# Patient Record
Sex: FEMALE | Race: WHITE | Employment: UNEMPLOYED | URBAN - METROPOLITAN AREA
[De-identification: names, ages, dates, MRNs, and addresses within clinical notes are randomized per-mention and may not be internally consistent; named-entity substitution may affect disease eponyms.]

---
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
## 2019-03-13 ENCOUNTER — OFFICE VISIT (OUTPATIENT)
Dept: OBGYN CLINIC | Age: 19
End: 2019-03-13
Payer: COMMERCIAL

## 2019-03-13 ENCOUNTER — HOSPITAL ENCOUNTER (OUTPATIENT)
Age: 19
Setting detail: SPECIMEN
Discharge: HOME | End: 2019-03-13
Payer: COMMERCIAL

## 2019-03-13 VITALS
RESPIRATION RATE: 18 BRPM | BODY MASS INDEX: 32.4 KG/M2 | DIASTOLIC BLOOD PRESSURE: 80 MMHG | HEART RATE: 94 BPM | TEMPERATURE: 97.9 F | WEIGHT: 171.6 LBS | SYSTOLIC BLOOD PRESSURE: 130 MMHG | HEIGHT: 61 IN

## 2019-03-13 DIAGNOSIS — N92.6 MISSED MENSES: Primary | ICD-10-CM

## 2019-03-13 DIAGNOSIS — N91.2 AMENORRHEA: ICD-10-CM

## 2019-03-13 DIAGNOSIS — Z32.01 POSITIVE PREGNANCY TEST: ICD-10-CM

## 2019-03-13 DIAGNOSIS — O36.80X0 PREGNANCY WITH INCONCLUSIVE FETAL VIABILITY, SINGLE OR UNSPECIFIED FETUS: ICD-10-CM

## 2019-03-13 LAB
CONTROL: ABNORMAL
HCG QUANTITATIVE: 2557 IU/L
PREGNANCY TEST URINE, POC: POSITIVE

## 2019-03-13 PROCEDURE — 81025 URINE PREGNANCY TEST: CPT | Performed by: CLINICAL NURSE SPECIALIST

## 2019-03-13 PROCEDURE — 99213 OFFICE O/P EST LOW 20 MIN: CPT | Performed by: CLINICAL NURSE SPECIALIST

## 2019-03-13 RX ORDER — PROMETHAZINE HYDROCHLORIDE 25 MG/1
25 TABLET ORAL EVERY 8 HOURS PRN
Qty: 30 TABLET | Refills: 2 | Status: SHIPPED | OUTPATIENT
Start: 2019-03-13 | End: 2019-04-29

## 2019-03-13 RX ORDER — VITAMIN A ACETATE, .BETA.-CAROTENE, ASCORBIC ACID, CHOLECALCIFEROL, .ALPHA.-TOCOPHEROL ACETATE, DL-, THIAMINE MONONITRATE, RIBOFLAVIN, NIACINAMIDE, PYRIDOXINE HYDROCHLORIDE, FOLIC ACID, CYANOCOBALAMIN, CALCIUM CARBONATE, FERROUS FUMARATE, ZINC OXIDE, AND CUPRIC OXIDE 2000; 2000; 120; 400; 22; 1.84; 3; 20; 10; 1; 12; 200; 27; 25; 2 [IU]/1; [IU]/1; MG/1; [IU]/1; MG/1; MG/1; MG/1; MG/1; MG/1; MG/1; UG/1; MG/1; MG/1; MG/1; MG/1
1 TABLET ORAL DAILY
Qty: 30 TABLET | Refills: 11 | Status: SHIPPED | OUTPATIENT
Start: 2019-03-13 | End: 2020-05-05

## 2019-03-18 ENCOUNTER — NURSE ONLY (OUTPATIENT)
Dept: OBGYN CLINIC | Age: 19
End: 2019-03-18

## 2019-03-18 ENCOUNTER — HOSPITAL ENCOUNTER (OUTPATIENT)
Age: 19
Setting detail: SPECIMEN
Discharge: HOME | End: 2019-03-18
Payer: COMMERCIAL

## 2019-03-18 DIAGNOSIS — Z32.01 POSITIVE PREGNANCY TEST: Primary | ICD-10-CM

## 2019-03-18 DIAGNOSIS — Z32.01 POSITIVE PREGNANCY TEST: ICD-10-CM

## 2019-03-18 DIAGNOSIS — N91.2 AMENORRHEA: ICD-10-CM

## 2019-03-18 LAB — HCG QUANTITATIVE: ABNORMAL IU/L

## 2019-03-20 ENCOUNTER — HOSPITAL ENCOUNTER (EMERGENCY)
Age: 19
Discharge: HOME | End: 2019-03-21
Attending: EMERGENCY MEDICINE
Payer: COMMERCIAL

## 2019-03-20 VITALS
DIASTOLIC BLOOD PRESSURE: 90 MMHG | TEMPERATURE: 99 F | WEIGHT: 171 LBS | BODY MASS INDEX: 32.28 KG/M2 | SYSTOLIC BLOOD PRESSURE: 160 MMHG | RESPIRATION RATE: 16 BRPM | OXYGEN SATURATION: 100 % | HEART RATE: 117 BPM | HEIGHT: 61 IN

## 2019-03-20 DIAGNOSIS — L50.9 URTICARIA: Primary | ICD-10-CM

## 2019-03-20 PROCEDURE — 99282 EMERGENCY DEPT VISIT SF MDM: CPT

## 2019-03-20 PROCEDURE — 6370000000 HC RX 637 (ALT 250 FOR IP): Performed by: PHYSICIAN ASSISTANT

## 2019-03-20 RX ORDER — DIPHENHYDRAMINE HCL 25 MG
25 TABLET ORAL ONCE
Status: DISCONTINUED | OUTPATIENT
Start: 2019-03-20 | End: 2019-03-20

## 2019-03-20 RX ORDER — DIPHENHYDRAMINE HCL 25 MG
50 TABLET ORAL ONCE
Status: COMPLETED | OUTPATIENT
Start: 2019-03-20 | End: 2019-03-20

## 2019-03-20 RX ADMIN — DIPHENHYDRAMINE HCL 50 MG: 25 TABLET ORAL at 23:37

## 2019-03-21 ASSESSMENT — ENCOUNTER SYMPTOMS
VOMITING: 0
NAUSEA: 0
ABDOMINAL PAIN: 0

## 2019-04-29 ENCOUNTER — INITIAL PRENATAL (OUTPATIENT)
Dept: OBGYN CLINIC | Age: 19
End: 2019-04-29

## 2019-04-29 ENCOUNTER — HOSPITAL ENCOUNTER (OUTPATIENT)
Age: 19
Setting detail: SPECIMEN
Discharge: HOME | End: 2019-04-29
Payer: COMMERCIAL

## 2019-04-29 VITALS
BODY MASS INDEX: 31.89 KG/M2 | DIASTOLIC BLOOD PRESSURE: 68 MMHG | HEART RATE: 76 BPM | SYSTOLIC BLOOD PRESSURE: 119 MMHG | WEIGHT: 168.8 LBS

## 2019-04-29 DIAGNOSIS — Z3A.11 11 WEEKS GESTATION OF PREGNANCY: ICD-10-CM

## 2019-04-29 DIAGNOSIS — Z11.3 SCREEN FOR STD (SEXUALLY TRANSMITTED DISEASE): ICD-10-CM

## 2019-04-29 DIAGNOSIS — N92.6 MISSED MENSES: ICD-10-CM

## 2019-04-29 DIAGNOSIS — Z34.01 ENCOUNTER FOR SUPERVISION OF NORMAL FIRST PREGNANCY IN FIRST TRIMESTER: Primary | ICD-10-CM

## 2019-04-29 DIAGNOSIS — N91.2 AMENORRHEA: ICD-10-CM

## 2019-04-29 LAB
-: NORMAL
ABO/RH: NORMAL
ABSOLUTE EOS #: 0.16 K/UL (ref 0–0.44)
ABSOLUTE IMMATURE GRANULOCYTE: <0.03 K/UL (ref 0–0.3)
ABSOLUTE LYMPH #: 1.83 K/UL (ref 1.2–5.2)
ABSOLUTE MONO #: 0.6 K/UL (ref 0.1–1.4)
AMORPHOUS: NORMAL
ANTIBODY SCREEN: NEGATIVE
BACTERIA: NORMAL
BASOPHILS # BLD: 0 % (ref 0–2)
BASOPHILS ABSOLUTE: 0.03 K/UL (ref 0–0.2)
BILIRUBIN URINE: NEGATIVE
CASTS UA: NORMAL /LPF (ref 0–8)
COLOR: YELLOW
COMMENT UA: ABNORMAL
CRYSTALS, UA: NORMAL /HPF
DIFFERENTIAL TYPE: ABNORMAL
EOSINOPHILS RELATIVE PERCENT: 2 % (ref 1–4)
EPITHELIAL CELLS UA: NORMAL /HPF (ref 0–5)
GLUCOSE URINE: NEGATIVE
HCT VFR BLD CALC: 38.6 % (ref 36.3–47.1)
HEMOGLOBIN: 13 G/DL (ref 11.9–15.1)
HEPATITIS B SURFACE ANTIGEN: NONREACTIVE
HIV AG/AB: NONREACTIVE
IMMATURE GRANULOCYTES: 0 %
KETONES, URINE: NEGATIVE
LEUKOCYTE ESTERASE, URINE: ABNORMAL
LYMPHOCYTES # BLD: 22 % (ref 25–45)
MCH RBC QN AUTO: 29.6 PG (ref 25.2–33.5)
MCHC RBC AUTO-ENTMCNC: 33.7 G/DL (ref 28.4–34.8)
MCV RBC AUTO: 87.9 FL (ref 82.6–102.9)
MONOCYTES # BLD: 7 % (ref 2–8)
MUCUS: NORMAL
NITRITE, URINE: NEGATIVE
NRBC AUTOMATED: 0 PER 100 WBC
OTHER OBSERVATIONS UA: NORMAL
PDW BLD-RTO: 12.7 % (ref 11.8–14.4)
PH UA: 8 (ref 5–8)
PLATELET # BLD: 273 K/UL (ref 138–453)
PLATELET ESTIMATE: ABNORMAL
PMV BLD AUTO: 10.3 FL (ref 8.1–13.5)
PROTEIN UA: NEGATIVE
RBC # BLD: 4.39 M/UL (ref 3.95–5.11)
RBC # BLD: ABNORMAL 10*6/UL
RBC UA: NORMAL /HPF (ref 0–4)
RENAL EPITHELIAL, UA: NORMAL /HPF
RUBV IGG SER QL: 24.8 IU/ML
SEG NEUTROPHILS: 69 % (ref 34–64)
SEGMENTED NEUTROPHILS ABSOLUTE COUNT: 5.67 K/UL (ref 1.8–8)
SICKLE CELL SCREEN: NEGATIVE
SPECIFIC GRAVITY UA: 1.01 (ref 1–1.03)
T. PALLIDUM, IGG: NONREACTIVE
TRICHOMONAS: NORMAL
TSH SERPL DL<=0.05 MIU/L-ACNC: 1.37 MIU/L (ref 0.3–5)
TURBIDITY: CLEAR
URINE HGB: ABNORMAL
UROBILINOGEN, URINE: NORMAL
WBC # BLD: 8.3 K/UL (ref 4.5–13.5)
WBC # BLD: ABNORMAL 10*3/UL
WBC UA: NORMAL /HPF (ref 0–5)
YEAST: NORMAL

## 2019-04-29 PROCEDURE — 0502F SUBSEQUENT PRENATAL CARE: CPT | Performed by: CLINICAL NURSE SPECIALIST

## 2019-04-29 NOTE — PROGRESS NOTES
Patient is in the office today for IPV visit/lab  Draw per provider order using sterile technique. Attempts x3 in the Lt antecubital, et hand  made, with final draw Drawn from the Rt antecubital.  Patient tolerated procedure well.

## 2019-04-29 NOTE — PROGRESS NOTES
Verenice Lamas is a 22 y.o. female 9w2d        OB History    Para Term  AB Living   1             SAB TAB Ectopic Molar Multiple Live Births                    # Outcome Date GA Lbr Wayne/2nd Weight Sex Delivery Anes PTL Lv   1 Current                  Blood pressure 119/68, pulse 76, weight 168 lb 12.8 oz (76.6 kg), last menstrual period 2019, not currently breastfeeding. The patient was seen and evaluated. There was N/A fetal movements. No contractions or leakage of fluid. Signs and symptoms of  labor as well as labor were reviewed. Dates were reviewed with the patient. The physical exam will be  completed along with pap and cultures at her next visit. Prenatal labs were drawn today. The patient will return to the office for her next visit in 2 weeks. See antepartum flow sheet. Patient Active Problem List    Diagnosis Date Noted    Bipolar 1 disorder ([facility] [address] 10/08/2018    PCOS (polycystic ovarian syndrome) 2017    Deliberate self-cutting 2017    Need for Menactra vaccination 2016        Diagnosis Orders   1. Encounter for supervision of normal first pregnancy in first trimester  Glucose tolerance, 1 hour   2. 11 weeks gestation of pregnancy     3. Screen for STD (sexually transmitted disease)  Chlamydia Trachomatis & Neisseria gonorrhoeae (GC) by amplified detection    Continue prenatal vitamins, increase water intake and frequent rest periods as needed. 11w4d Prenatal history and OB education, including milestones throughout the pregnancy, vaccinations recommended while pregnant, any risk factors that may cause the patient to become high risk requiring  testing, and any viruses that may cause complications or fetal anomalies was completed. Total time spent with patient was 20 minutes face-to-face.       Electronically signed by: Scott Bruno CNP

## 2019-04-30 LAB
CULTURE: NORMAL
Lab: NORMAL
SPECIMEN DESCRIPTION: NORMAL

## 2019-05-06 LAB — CYSTIC FIBROSIS: NORMAL

## 2019-05-08 DIAGNOSIS — N92.6 MISSED MENSES: ICD-10-CM

## 2019-05-08 DIAGNOSIS — N91.2 AMENORRHEA: ICD-10-CM

## 2019-05-14 ENCOUNTER — HOSPITAL ENCOUNTER (OUTPATIENT)
Age: 19
Setting detail: SPECIMEN
Discharge: HOME | End: 2019-05-14
Payer: COMMERCIAL

## 2019-05-14 ENCOUNTER — ROUTINE PRENATAL (OUTPATIENT)
Dept: OBGYN CLINIC | Age: 19
End: 2019-05-14

## 2019-05-14 VITALS
WEIGHT: 165 LBS | DIASTOLIC BLOOD PRESSURE: 78 MMHG | BODY MASS INDEX: 31.18 KG/M2 | HEART RATE: 94 BPM | SYSTOLIC BLOOD PRESSURE: 118 MMHG

## 2019-05-14 DIAGNOSIS — Z34.01 ENCOUNTER FOR SUPERVISION OF NORMAL FIRST PREGNANCY IN FIRST TRIMESTER: Primary | ICD-10-CM

## 2019-05-14 DIAGNOSIS — Z34.01 ENCOUNTER FOR SUPERVISION OF NORMAL FIRST PREGNANCY IN FIRST TRIMESTER: ICD-10-CM

## 2019-05-14 DIAGNOSIS — N76.0 ACUTE VAGINITIS: ICD-10-CM

## 2019-05-14 DIAGNOSIS — Z11.3 SCREEN FOR STD (SEXUALLY TRANSMITTED DISEASE): ICD-10-CM

## 2019-05-14 DIAGNOSIS — Z3A.13 13 WEEKS GESTATION OF PREGNANCY: ICD-10-CM

## 2019-05-14 LAB
GLUCOSE ADMINISTRATION: NORMAL
GLUCOSE TOLERANCE SCREEN 50G: 108 MG/DL (ref 70–135)

## 2019-05-14 PROCEDURE — 0502F SUBSEQUENT PRENATAL CARE: CPT | Performed by: CLINICAL NURSE SPECIALIST

## 2019-05-14 RX ORDER — METRONIDAZOLE 500 MG/1
500 TABLET ORAL 2 TIMES DAILY
Qty: 14 TABLET | Refills: 0 | Status: SHIPPED | OUTPATIENT
Start: 2019-05-14 | End: 2019-05-21

## 2019-05-14 NOTE — PROGRESS NOTES
Nora Barillas is a 22 y.o. female 13w5d        OB History    Para Term  AB Living   1             SAB TAB Ectopic Molar Multiple Live Births                    # Outcome Date GA Lbr Wayne/2nd Weight Sex Delivery Anes PTL Lv   1 Current                Blood pressure 118/78, pulse 94, weight 165 lb (74.8 kg), last menstrual period 2019, not currently breastfeeding. The patient was seen and evaluated. There was N/A fetal movements. No contractions or leakage of fluid. Signs and symptoms of  labor as well as labor were reviewed. A Quad Screen was ordered for a 15-20 week gestational age window. Dates were reviewed with the patient. An 18-22 week anatomy ultrasound has been ordered. The patient will return to the office for her next visit in 4 weeks. See antepartum flow sheet. Patient Active Problem List    Diagnosis Date Noted    Bipolar 1 disorder ([facility] [address] 10/08/2018    PCOS (polycystic ovarian syndrome) 2017    Deliberate self-cutting 2017    Need for Menactra vaccination 2016        Diagnosis Orders   1. Encounter for supervision of normal first pregnancy in first trimester     2. 13 weeks gestation of pregnancy     Continue prenatal vitamins, increase water intake and frequent rest periods as needed.       Electronically signed by: Germania Latif CNP

## 2019-05-14 NOTE — PATIENT INSTRUCTIONS
Patient Education        Learning About When to Call Your Doctor During Pregnancy (Up to 20 Weeks)  Your Care Instructions    It's common to have concerns about what might be a problem during pregnancy. Although most pregnant women don't have any serious problems, it's important to know when to call your doctor if you have certain symptoms. These are general suggestions. Your doctor may give you some more information about when to call. When to call your doctor (up to 20 weeks)  Call 911 anytime you think you may need emergency care. For example, call if:  · You passed out (lost consciousness). Call your doctor now or seek immediate medical care if:  · You have a fever. · You have vaginal bleeding. · You are dizzy or lightheaded, or you feel like you may faint. · You have symptoms of a urinary tract infection. These may include:  ? Pain or burning when you urinate. ? A frequent need to urinate without being able to pass much urine. ? Pain in the flank, which is just below the rib cage and above the waist on either side of the back. ? Blood in your urine. · You have belly pain. · You think you are having contractions. · You have a sudden release of fluid from your vagina. Watch closely for changes in your health, and be sure to contact your doctor if:  · You have vaginal discharge that smells bad. · You have other concerns about your pregnancy. Follow-up care is a key part of your treatment and safety. Be sure to make and go to all appointments, and call your doctor if you are having problems. It's also a good idea to know your test results and keep a list of the medicines you take. Where can you learn more? Go to https://fadia.Funium. org and sign in to your "[employer] account. Enter I011 in the [facility] box to learn more about \"Learning About When to Call Your Doctor During Pregnancy (Up to 20 Weeks). \"     If you do not have an account, please click on the \"Sign Up Now\" link. Current as of: September 5, 2018  Content Version: 12.0  © 0225-7371 [employer]USYS. Care instructions adapted under license by [facility] ([facility]). If you have questions about a medical condition or this instruction, always ask your healthcare professional. [address] any warranty or liability for your use of this information. Patient Education        Round Ligament Pain: Care Instructions  Your Care Instructions    Round ligament pain is a common pain during pregnancy. You may feel a sharp brief pain on one or both sides of your belly. It may go down into your groin. It's usually felt for the first time during the second trimester. This pain is a normal part of pregnancy. It will go away as your pregnancy continues or after your baby is born. Your uterus is supported by two ligaments that go from the top and sides of the uterus to the bones of the pelvis. These are the round ligaments. As your uterus grows, these ligaments stretch and tighten with your movements. This may be the cause of the pain. You may find that certain activities seem to cause pain. If you can, avoid those activities. Your doctor can usually diagnose round ligament pain from your symptoms and an exam. If you have bleeding or other symptoms, your doctor may also do an imaging test, such as an ultrasound. Your doctor may suggest that you take an over-the-counter pain medicine, such as acetaminophen. Follow-up care is a key part of your treatment and safety. Be sure to make and go to all appointments, and call your doctor if you are having problems. It's also a good idea to know your test results and keep a list of the medicines you take. How can you care for yourself at home? · If certain movements seem to trigger the pain, see if you can avoid them while you are pregnant. · Stay active. If your doctor says it's okay, try moderate exercise.  Many pregnant women find that water exercise is most comfortable. Examples are swimming and water aerobics. · Ask your doctor about taking acetaminophen for pain. Be safe with medicines. Read and follow all instructions on the label. When should you call for help? Call your doctor now or seek immediate medical care if:    · You think you might be in labor.     · You have new or worse pain.    Watch closely for changes in your health, and be sure to contact your doctor if you have any problems. Where can you learn more? Go to https://FloobitspeArtify It.RegeneRx. org and sign in to your [employer] account. Enter R110 in the [employer] box to learn more about \"Round Ligament Pain: Care Instructions. \"     If you do not have an account, please click on the \"Sign Up Now\" link. Current as of: September 5, 2018  Content Version: 12.0  © 7669-2114 Healthwise, Incorporated. Care instructions adapted under license by [facility] ([facility]). If you have questions about a medical condition or this instruction, always ask your healthcare professional. [place] [address] any warranty or liability for your use of this information. Patient Education        Screening Tests for Birth Defects: Care Instructions  Your Care Instructions    Screening tests for birth defects are done during pregnancy to look for possible problems with the baby (fetus). They show the chance that a baby has a certain birth defect. Down syndrome, spina bifida, and trisomy 25 are examples. There are many types of screening tests you may have during your pregnancy. During your first trimester you may have:  · Blood tests at 10 to 13 weeks. · Nuchal translucency test at 11 to 14 weeks. · Cell free fetal DNA test at 10 weeks or later. During your second trimester, you may have:  · Triple or quad screening at 15 to 20 weeks. · Ultrasound at 18 to 20 weeks. Follow-up care is a key part of your treatment and safety.  Be sure to make and go to all appointments, and call your doctor if the \"Sign Up Now\" link. Current as of: September 5, 2018  Content Version: 12.0  © 3936-8738 Tyk[employer]. Care instructions adapted under license by [facility] ([facility]). If you have questions about a medical condition or this instruction, always ask your healthcare professional. [address] any warranty or liability for your use of this information. Patient Education        Learning About Prenatal Visits  Your Care Instructions    Regular prenatal visits are very important during any pregnancy. These quick office visits may seem simple and routine. But they can help you and your baby stay healthy. Your doctor is watching for problems that can only be found by regularly checking you and your baby. The visits also give you and your doctor time to build a good relationship. Many women have prenatal visits every 4 to 6 weeks until week 28 of pregnancy. Then the visits become more frequent. This is often every 2 to 3 weeks through week 36 of pregnancy. In the final month of pregnancy, you likely will see your doctor every week. You may have a different schedule if you have a medical problem or are a teen. At different times in your pregnancy, you will have exams and tests. Some are routine. Others are done only when there is a chance of a problem. Everything healthy you do for your body helps your growing baby. Rest when you need it. Eat well, drink plenty of water, and exercise regularly. What happens during a prenatal visit? · You will have blood pressure checks, along with urine tests. You also may have blood tests. If you need to go to the bathroom while waiting for the doctor, tell the nurse. He or she will give you a sample cup so your urine can be tested. · You will be weighed and have your belly measured. · Your doctor may listen to your baby's heartbeat with a special stethoscope.   · In your second trimester, your doctor will check your blood sugar (glucose tolerance to your [employer] account. Enter J502 in the [facility] box to learn more about \"Learning About Prenatal Visits. \"     If you do not have an account, please click on the \"Sign Up Now\" link. Current as of: September 5, 2018  Content Version: 12.0  © 8508-1752 [employer]. Care instructions adapted under license by [facility] ([facility]). If you have questions about a medical condition or this instruction, always ask your healthcare professional. [address] any warranty or liability for your use of this information. Patient Education        Nutrition During Pregnancy: Care Instructions  Your Care Instructions    Healthy eating when you are pregnant is important for you and your baby. It can help you feel well and have a successful pregnancy and delivery. During pregnancy your nutrition needs increase. Even if you have excellent eating habits, your doctor may recommend a multivitamin to make sure you get enough iron and folic acid. Many pregnant women wonder how much weight they should gain. In general, women who were at a healthy weight before they became pregnant should gain between 25 and 35 pounds. Women who were overweight before pregnancy are usually advised to gain 15 to 25 pounds. Women who were underweight before pregnancy are usually advised to gain 28 to 40 pounds. Your doctor will work with you to set a weight goal that is right for you. Gaining a healthy amount of weight helps you have a healthy baby. Follow-up care is a key part of your treatment and safety. Be sure to make and go to all appointments, and call your doctor if you are having problems. It's also a good idea to know your test results and keep a list of the medicines you take. How can you care for yourself at home? · Eat plenty of fruits and vegetables. Include a variety of orange, yellow, and leafy dark-green vegetables every day. · Choose whole-grain bread, cereal, and pasta.  Good choices include whole wheat bread, whole wheat pasta, brown rice, and oatmeal.  · Get 4 or more servings of milk and milk products each day. Good choices include nonfat or low-fat milk, yogurt, and cheese. If you cannot eat milk products, you can get calcium from calcium-fortified products such as orange juice, soy milk, and tofu. Other non-milk sources of calcium include leafy green vegetables, such as broccoli, kale, mustard greens, turnip greens, bok jairon, and brussels sprouts. · If you eat meat, pick lower-fat types. Good choices include lean cuts of meat and chicken or turkey without the skin. · Do not eat shark, swordfish, janel mackerel, or tilefish. They have high levels of mercury, which is dangerous to your baby. You can eat up to 12 ounces a week of fish or shellfish that have low mercury levels. Good choices include shrimp, wild salmon, pollock, and catfish. Do not eat more than 6 ounces of tuna each week. · Heat lunch meats (such as turkey, ham, or bologna) to 165°F before you eat them. This reduces your risk of getting sick from a kind of bacteria that can be found in lunch meats. · Do not eat unpasteurized soft cheeses, such as brie, feta, fresh mozzarella, and blue cheese. They have a bacteria that could harm your baby. · Limit caffeine. If you drink coffee or tea, have no more than 1 cup a day. Caffeine is also found in elma. · Do not drink any alcohol. No amount of alcohol has been found to be safe during pregnancy. · Do not diet or try to lose weight. For example, do not follow a low-carbohydrate diet. If you are overweight at the start of your pregnancy, your doctor will work with you to manage your weight gain. · Tell your doctor about all vitamins and supplements you take. When should you call for help? Watch closely for changes in your health, and be sure to contact your doctor if you have any problems. Where can you learn more? Go to https://fadia.health-partners. org and sign in to your [employer] account. Enter Y785 in the [facility] box to learn more about \"Nutrition During Pregnancy: Care Instructions. \"     If you do not have an account, please click on the \"Sign Up Now\" link. Current as of: September 5, 2018  Content Version: 12.0  © 2453-2438 [employer]. Care instructions adapted under license by [facility] ([facility]). If you have questions about a medical condition or this instruction, always ask your healthcare professional. [address] any warranty or liability for your use of this information. Patient Education        Learning About Pregnancy  Your Care Instructions    Your health in the early weeks of your pregnancy is particularly important for your baby's health. Take good care of yourself. Anything you do that harms your body can also harm your baby. Make sure to go to all of your doctor appointments. Regular checkups will help keep you and your baby healthy. How can you care for yourself at home? Diet    · Eat a balanced diet. Make sure your diet includes plenty of beans, peas, and leafy green vegetables.     · Do not skip meals or go for many hours without eating. If you are nauseated, try to eat a small, healthy snack every 2 to 3 hours.     · Do not eat fish that has a high level of mercury, such as shark, swordfish, or mackerel. Do not eat more than one can of tuna each week.     · Drink plenty of fluids, enough so that your urine is light yellow or clear like water. If you have kidney, heart, or liver disease and have to limit fluids, talk with your doctor before you increase the amount of fluids you drink.     · Cut down on caffeine, such as coffee, tea, and cola.     · Do not drink alcohol, such as beer, wine, or hard liquor.     · Take a multivitamin that contains at least 400 micrograms (mcg) of folic acid to help prevent birth defects. Fortified cereal and whole wheat bread are good additional sources of folic acid.   · Increase the calcium in your diet. Try to drink a quart of skim milk each day. You may also take calcium supplements and choose foods such as cheese and yogurt.    Lifestyle    · Make sure you go to your follow-up appointments.     · Get plenty of rest. You may be unusually tired while you are pregnant.     · Get at least 30 minutes of exercise on most days of the week. Walking is a good choice. If you have not exercised in the past, start out slowly. Take several short walks each day.     · Do not smoke. If you need help quitting, talk to your doctor about stop-smoking programs. These can increase your chances of quitting for good.     · Do not touch cat feces or litter boxes. Also, wash your hands after you handle raw meat, and fully cook all meat before you eat it. Wear gloves when you work in the yard or garden, and wash your hands well when you are done. Cat feces, raw or undercooked meat, and contaminated dirt can cause an infection that may harm your baby or lead to a miscarriage.     · Do not use saunas or hot tubs. Raising your body temperature may harm your baby.     · Avoid chemical fumes, paint fumes, or poisons.     · Do not use illegal drugs or alcohol. Medicines    · Review all of your medicines with your doctor. Some of your routine medicines may need to be changed to protect your baby.     · Use acetaminophen (Tylenol) to relieve minor problems, such as a mild headache or backache or a mild fever with cold symptoms. Do not use nonsteroidal anti-inflammatory drugs (NSAIDs), such as ibuprofen (Advil, Motrin) or naproxen (Aleve), unless your doctor says it is okay.     · Do not take two or more pain medicines at the same time unless the doctor told you to. Many pain medicines have acetaminophen, which is Tylenol. Too much acetaminophen (Tylenol) can be harmful.     · Take your medicines exactly as prescribed.  Call your doctor if you think you are having a problem with your medicine.    To manage morning sickness    · If you feel sick when you first wake up, try eating a small snack (such as crackers) before you get out of bed. Allow some time to digest the snack, and then get out of bed slowly.     · Do not skip meals or go for long periods without eating. An empty stomach can make nausea worse.     · Eat small, frequent meals instead of three large meals each day.     · Drink plenty of fluids. Sports drinks, such as Gatorade or Powerade, are good choices.     · Eat foods that are high in protein but low in fat.     · If you are taking iron supplements, ask your doctor if they are necessary. Iron can make nausea worse.     · Avoid any smells, such as coffee, that make you feel sick.     · Get lots of rest. Morning sickness may be worse when you are tired. Follow-up care is a key part of your treatment and safety. Be sure to make and go to all appointments, and call your doctor if you are having problems. It's also a good idea to know your test results and keep a list of the medicines you take. Where can you learn more? Go to https://Loot!.[employer] Technologies. org and sign in to your [employer] account. Enter T745 in the [employer] box to learn more about \"Learning About Pregnancy. \"     If you do not have an account, please click on the \"Sign Up Now\" link. Current as of: September 5, 2018  Content Version: 12.0  © 7893-7923 Healthwise, [employer]. Care instructions adapted under license by [facility] ([facility]). If you have questions about a medical condition or this instruction, always ask your healthcare professional. [place] [address] any warranty or liability for your use of this information. Patient Education        Exercise During Pregnancy: Care Instructions  Your Care Instructions    Exercise is good for healthy pregnant women. It can relieve back pain, swelling, and other discomforts of pregnancy. It also prepares your muscles for childbirth.  And exercise can improve your energy level and help you sleep better. If your doctor recommends it, get more exercise. Walking is a good choice. Bit by bit, increase the amount you walk every day. Try for at least 30 minutes on most days of the week. But if you do not already exercise, be sure to talk with your doctor before you start a new exercise program. Try exercise classes for pregnant women. Doctors do not recommend contact sports during pregnancy. Follow-up care is a key part of your treatment and safety. Be sure to make and go to all appointments, and call your doctor if you are having problems. It's also a good idea to know your test results and keep a list of the medicines you take. How can you care for yourself at home? · Talk with your doctor about the right kind of exercise for each stage of pregnancy. · Listen to your body to know if your exercise is at a safe level. ? Do not become overheated while you exercise. ? If you feel tired, take it easy. You might walk instead of run.  ? If you are used to strenuous exercise, pay attention to changes in your body that mean it is time to slow down. ? High body temperature can be harmful to your baby. So if you want to use a sauna or hot tub, be sure to talk to your doctor about how to use them safely. · If you exercised before getting pregnant, you should be able to keep up your routine early in your pregnancy. That might include running and aerobics. Later, you may want to switch to swimming or walking. · Eat a small snack or drink juice 15 to 30 minutes before you exercise. · Eat a healthy diet. Make sure it includes plenty of beans, peas, and leafy green vegetables. You may need to increase how much you eat to get extra energy for exercise. · Drink plenty of fluids before, during, and after exercise. · Avoid contact sports, such as soccer and basketball. Also avoid scuba diving, exercise in high altitude (above 6,000 feet), and horseback riding.   · Do not get overtired while you exercise. You should be able to talk while you work out. · After your fourth month of pregnancy, avoid exercises (such as sit-ups and some yoga poses) that require you to lie flat on your back on a hard surface. · Try swimming and brisk walking during all your pregnancy. · Get plenty of rest. You may be very tired while you are pregnant. Where can you learn more? Go to https://chpepiceweb.[employer]. org and sign in to your [employer] account. Enter T322 in the [address] box to learn more about \"Exercise During Pregnancy: Care Instructions. \"     If you do not have an account, please click on the \"Sign Up Now\" link. Current as of: September 5, 2018  Content Version: 12.0  © 9990-4181 Healthwise, [employer]. Care instructions adapted under license by [facility] ([facility]). If you have questions about a medical condition or this instruction, always ask your healthcare professional. [place] [address] any warranty or liability for your use of this information. Patient Education        During Pregnancy: Exercises  Your Care Instructions  Here are some examples of exercises to do during your pregnancy. Start each exercise slowly. Ease off the exercise if you start to have pain. Your doctor or physical therapist will tell you when you can start these exercises and which ones will work best for you. How to do the exercises  Neck rotation    1. Sit in a firm chair, or stand up straight. 2. Keeping your chin level, turn your head to the right, and hold for 15 to 30 seconds. 3. Turn your head to the left and hold for 15 to 30 seconds. 4. Repeat 2 to 4 times to each side. Forward neck flexion    1. Sit in a firm chair, or stand up straight. 2. Bend your head forward. 3. Hold for 15 to 30 seconds. 4. Repeat 2 to 4 times. Back press    1. Place your feet 10 to 12 inches from the wall.   2. Rest your back flat against the wall and slide down the wall until your knees are slightly bent. 3. Press your lower back against the wall by pulling in your stomach muscles. 4. Hold for 6 seconds, and then relax your stomach muscles and slide back up the wall. 5. Repeat 8 to 12 times. Full body twist    1. Sit with your legs crossed. 2. Reach your left hand toward your right foot, and place your right hand at your side for support. 3. Slowly twist your torso to your right. 4. Switch your hands and twist to your left. 5. Repeat 2 to 4 times. Pelvic rocking    1. Kneeling on hands and knees, place your hands directly under your shoulders and your knees under your hips. 2. Breathe in deeply. Tuck your head downward and round your back up, making a curve with your back in the shape of the letter C. Hold this position for a count of 6.  3. Breathe out slowly and bring your head back up. Relax, keeping your back straight (don't allow it to curve toward the floor). Hold this for a count of 6.  4. Do this exercise 8 times or to your comfort level. Pelvic tilt    1. Lie on your back. 2. Keep your knees relaxed. 3. Tighten your belly and buttocks muscles. 4. At the same time, gently shift your pelvis upward. This should flatten the curve in your back. 5. Hold for 6 seconds and then relax. 6. Gradually increase the number of tilts you do each day, to your comfort level. Backward stretch    1. Kneel on hands and knees with your knees 8 to 10 inches apart, hands directly under your shoulders, and arms and back straight. 2. Keeping your arms straight, slowly lower your buttocks toward your heels and tuck your head toward your knees. Hold for 15 to 30 seconds. 3. Slowly return to the kneeling position. 4. Repeat 2 to 4 times. Forward bend    1. Sit comfortably in a chair, with your arms relaxed. 2. Slowly bend forward, allowing your arms to hang down in front of you. Lean only as far as you can without feeling discomfort or pressure on your belly.   3. Hold for 15 to 30 seconds and then slowly sit up straight. 4. Repeat 2 to 4 times or to your comfort level. Leg lift crawl    1. Kneeling on hands and knees, place your hands directly under your shoulders and straighten your arms. 2. Tighten your belly muscles by pulling in your belly button toward your spine. Be sure you continue to breathe normally and do not hold your breath. 3. Lift your left knee and bring it toward your elbow. 4. Slowly extend your leg behind you without completely straightening it. Be careful not to let your hip drop down. Avoid arching your back. 5. Hold your leg behind you for about 6 seconds. 6. Return to your starting position. 7. Do the same exercise with your other leg. 8. Repeat 8 to 12 times for each leg. Tailor sitting    1. Sit on the floor. 2. Bring your feet close to your body while crossing your ankles. 3. Hold this position for as long as you are comfortable. Tailor stretching    1. Sit on the floor with your back straight, legs about 12 inches apart, and feet relaxed outward. 2. Stretch your hands forward toward your left foot, then sit up. 3. Stretch your hands straight forward, then sit up. 4. Stretch your hands forward toward your right foot, then sit up. 5. Hold each stretch for 15 to 30 seconds. 6. Repeat 2 to 4 times. Follow-up care is a key part of your treatment and safety. Be sure to make and go to all appointments, and call your doctor if you are having problems. It's also a good idea to know your test results and keep a list of the medicines you take. Where can you learn more? Go to https://Boatboundjames.[employer]. org and sign in to your [employer] account. Enter O791 in the [facility] box to learn more about \"During Pregnancy: Exercises. \"     If you do not have an account, please click on the \"Sign Up Now\" link. Current as of: September 5, 2018  Content Version: 12.0  © 7723-2759 Healthwise, Incorporated.  Care instructions adapted under license by [facility] ([facility]). If you have questions about a medical condition or this instruction, always ask your healthcare professional. [address] any warranty or liability for your use of this information.

## 2019-05-15 ENCOUNTER — TELEPHONE (OUTPATIENT)
Dept: OBGYN CLINIC | Age: 19
End: 2019-05-15

## 2019-05-15 DIAGNOSIS — A74.9 CHLAMYDIA: Primary | ICD-10-CM

## 2019-05-15 LAB
C TRACH DNA GENITAL QL NAA+PROBE: ABNORMAL
DIRECT EXAM: NORMAL
Lab: NORMAL
N. GONORRHOEAE DNA: NEGATIVE
SPECIMEN DESCRIPTION: ABNORMAL
SPECIMEN DESCRIPTION: NORMAL

## 2019-05-15 RX ORDER — AZITHROMYCIN 500 MG/1
1000 TABLET, FILM COATED ORAL ONCE
Qty: 2 TABLET | Refills: 0 | Status: SHIPPED | OUTPATIENT
Start: 2019-05-15 | End: 2019-05-15

## 2019-05-15 NOTE — TELEPHONE ENCOUNTER
----- Message from JARED Tripp CNP sent at 5/15/2019  1:14 PM EDT -----  Please let the patient knwo that she was positive for chlamydia and I sent zithromax she is to take both tabs in 1 dose, partner needs tx and no intercourse for 1 wk after tx is complete.   She will need recultured in 4 wks    thanks

## 2019-05-15 NOTE — TELEPHONE ENCOUNTER
Pt called back. Pt was informed to take pills together, that partner needs to be treated, that after treatment is complete for both her and her partner not to have intercourse for at least 1 week, and that she will need AMBER in 4 weeks. Pt already has a prenatal appointment scheduled for 6/11/19 at which time she will have AMBER also.

## 2019-05-16 ENCOUNTER — TELEPHONE (OUTPATIENT)
Dept: OBGYN CLINIC | Age: 19
End: 2019-05-16

## 2019-06-04 ENCOUNTER — HOSPITAL ENCOUNTER (EMERGENCY)
Age: 19
Discharge: HOME | End: 2019-06-04
Attending: EMERGENCY MEDICINE
Payer: COMMERCIAL

## 2019-06-04 VITALS
HEART RATE: 74 BPM | HEIGHT: 61 IN | SYSTOLIC BLOOD PRESSURE: 112 MMHG | OXYGEN SATURATION: 100 % | TEMPERATURE: 98.2 F | RESPIRATION RATE: 16 BRPM | WEIGHT: 165 LBS | BODY MASS INDEX: 31.15 KG/M2 | DIASTOLIC BLOOD PRESSURE: 65 MMHG

## 2019-06-04 DIAGNOSIS — N93.9 VAGINAL BLEEDING: Primary | ICD-10-CM

## 2019-06-04 LAB
ABO/RH: NORMAL
ABO/RH: NORMAL
ABSOLUTE EOS #: 0.1 K/UL (ref 0–0.4)
ABSOLUTE IMMATURE GRANULOCYTE: ABNORMAL K/UL (ref 0–0.3)
ABSOLUTE LYMPH #: 2 K/UL (ref 1.2–5.2)
ABSOLUTE MONO #: 0.7 K/UL (ref 0.1–1.3)
ANTIBODY SCREEN: NEGATIVE
ARM BAND NUMBER: NORMAL
BASOPHILS # BLD: 0 % (ref 0–2)
BASOPHILS ABSOLUTE: 0 K/UL (ref 0–0.2)
DIFFERENTIAL TYPE: ABNORMAL
EOSINOPHILS RELATIVE PERCENT: 1 % (ref 0–4)
EXPIRATION DATE: NORMAL
HCT VFR BLD CALC: 33.6 % (ref 36–46)
HEMOGLOBIN: 12.2 G/DL (ref 12–16)
IMMATURE GRANULOCYTES: ABNORMAL %
LYMPHOCYTES # BLD: 21 % (ref 25–45)
MCH RBC QN AUTO: 31.3 PG (ref 26–34)
MCHC RBC AUTO-ENTMCNC: 36.3 G/DL (ref 31–37)
MCV RBC AUTO: 86.1 FL (ref 80–100)
MONOCYTES # BLD: 7 % (ref 2–8)
NRBC AUTOMATED: ABNORMAL PER 100 WBC
PDW BLD-RTO: 12.6 % (ref 11.5–14.9)
PLATELET # BLD: 244 K/UL (ref 150–450)
PLATELET ESTIMATE: ABNORMAL
PMV BLD AUTO: 7.9 FL (ref 6–12)
RBC # BLD: 3.9 M/UL (ref 4–5.2)
RBC # BLD: ABNORMAL 10*6/UL
SEG NEUTROPHILS: 71 % (ref 34–64)
SEGMENTED NEUTROPHILS ABSOLUTE COUNT: 6.9 K/UL (ref 1.3–9.1)
WBC # BLD: 9.7 K/UL (ref 4.5–13.5)
WBC # BLD: ABNORMAL 10*3/UL

## 2019-06-04 PROCEDURE — 99284 EMERGENCY DEPT VISIT MOD MDM: CPT

## 2019-06-04 PROCEDURE — 96372 THER/PROPH/DIAG INJ SC/IM: CPT

## 2019-06-04 PROCEDURE — 86900 BLOOD TYPING SEROLOGIC ABO: CPT

## 2019-06-04 PROCEDURE — 85025 COMPLETE CBC W/AUTO DIFF WBC: CPT

## 2019-06-04 PROCEDURE — 86850 RBC ANTIBODY SCREEN: CPT

## 2019-06-04 PROCEDURE — 86901 BLOOD TYPING SEROLOGIC RH(D): CPT

## 2019-06-04 PROCEDURE — 36415 COLL VENOUS BLD VENIPUNCTURE: CPT

## 2019-06-04 ASSESSMENT — ENCOUNTER SYMPTOMS
COUGH: 0
SORE THROAT: 0
NAUSEA: 0
CONSTIPATION: 0
DIARRHEA: 0
SHORTNESS OF BREATH: 0
BLOOD IN STOOL: 0
BACK PAIN: 0
ABDOMINAL PAIN: 0
VOMITING: 0
WHEEZING: 0

## 2019-06-04 NOTE — ED NOTES
Writer spoke with Dr. Chayito Betancur who said that we will be giving her the [address] called the Lab and updated them as well.       Jesse Edge RN  06/04/19 4626

## 2019-06-04 NOTE — ED NOTES
Writer called the Lab and let them know that writer will be tubing back the green sheet and the booklet after removing the copy for the Pt chart.       Jose Dunn RN  06/04/19 4575

## 2019-06-04 NOTE — ED PROVIDER NOTES
[address] ED  eMERGENCY dEPARTMENT eNCOUnter   Attending Attestation     Pt Name: Mark Johnson  MRN: [id number]  [place] 2000  Date of evaluation: 6/4/19       Mark Johnson is a 22 y.o. female who presents with Vaginal Bleeding (16 weeks pregnant)      History:       Exam: Vitals:   Vitals:    06/04/19 1532 06/04/19 1600 06/04/19 1923   BP: 137/74 106/62 112/65   Pulse: 105  74   Resp: 16  16   Temp: 98.2 °F (36.8 °C)     TempSrc: Oral     SpO2: 99% 97% 100%   Weight: 165 lb (74.8 kg)     Height: 5' 1\" (1.549 m)           I performed a history and physical examination of the patient and discussed management with the resident. I reviewed the residents note and agree with the documented findings and plan of care. Any areas of disagreement are noted on the chart. I was personally present for the key portions of any procedures. I have documented in the chart those procedures where I was not present during the key portions. I have personally reviewed all images and agree with the resident's interpretation. I have reviewed the emergency nurses triage note. I agree with the chief complaint, past medical history, past surgical history, allergies, medications, social and family history as documented unless otherwise noted below. Documentation of the HPI, Physical Exam and Medical Decision Making performed by medical students or scribes is based on my personal performance of the HPI, PE and MDM. For Phys Assistant/ Nurse Practitioner cases/documentation I have had a face to face evaluation of this patient and have completed at least one if not all key elements of the E/M (history, physical exam, and MDM). Additional findings are as noted.     Rani Michel MD  Attending Emergency  Physician              Rani Michel MD  06/04/19 9390

## 2019-06-04 NOTE — ED NOTES
Pt said that the duration of the bleeding has been about over an hour, that it's not a lot. That she's not having to wear a pad. This is Pt's 1st baby.       Erin Del Rosario RN  06/04/19 6265

## 2019-06-04 NOTE — ED PROVIDER NOTES
[address]  Emergency Department Encounter  EmergencyMedicine Resident     Pt Name:Missy Diop  MRN: [id number]  [place] 2000  Date of evaluation: 6/4/19  PCP:  Devan Headley MD    [address] [place] [address]       Chief Complaint   Patient presents with    Vaginal Bleeding     16 weeks pregnant       HISTORY OF PRESENT ILLNESS  (Location/Symptom, Timing/Onset, Context/Setting, Quality, Duration, Modifying Factors, Severity.)      Darvin Aguilera is a 22 y.o. female who presents with vaginal bleeding, the patient reports she had an episode of vaginal bleeding on the shower presently an hour prior to me seeing her. The patient states she was recently in the bathroom at our facility and still had a small amount of vaginal bleeding but for the most part the vaginal bleeding had stopped. The patient admits to some mild abdominal cramping but states this is typical for her. She follows with Dr. Pablo Contreras / Neftali Figueroa / Rekha Obrien / Claudette Morales      has a past medical history of Bipolar disorder ([facility] [address] Depression, Polycystic ovarian disease, Substance abuse ([facility] [address] and Trauma. has no past surgical history on file.     Social History     Socioeconomic History    Marital status: Single     Spouse name: Not on file    Number of children: Not on file    Years of education: Not on file    Highest education level: Not on file   Occupational History    Not on file   Social Needs    Financial resource strain: Not on file    Food insecurity:     Worry: Not on file     Inability: Not on file    Transportation needs:     Medical: Not on file     Non-medical: Not on file   Tobacco Use    Smoking status: Current Every Day Smoker     Packs/day: 0.25     Years: 1.00     Pack years: 0.25    Smokeless tobacco: Never Used    Tobacco comment: Pt 2-3 cigarettes/day   Substance and Sexual Activity    Alcohol use: No    Drug use: Not Currently     Frequency: 3.0 times per week     Types: Marijuana  Sexual activity: Yes     Partners: Male   Lifestyle    Physical activity:     Days per week: Not on file     Minutes per session: Not on file    Stress: Not on file   Relationships    Social connections:     Talks on phone: Not on file     Gets together: Not on file     Attends Sikh service: Not on file     Active member of club or organization: Not on file     Attends meetings of clubs or organizations: Not on file     Relationship status: Not on file    Intimate partner violence:     Fear of current or ex partner: Not on file     Emotionally abused: Not on file     Physically abused: Not on file     Forced sexual activity: Not on file   Other Topics Concern    Not on file   Social History Narrative    Not on file       Family History   Problem Relation Age of Onset    Bipolar Disorder Mother     Hypertension Maternal Grandmother     Coronary Art Dis Maternal Grandmother     Depression Paternal Grandmother        Allergies:  Patient has no known allergies. Home Medications:  Prior to Admission medications    Medication Sig Start Date End Date Taking? Authorizing Provider   Prenatal Vit-Fe Fumarate-FA (PNV PRENATAL PLUS MULTIVITAMIN) 27-1 MG TABS Take 1 tablet by mouth daily 3/13/19 3/7/20 Yes JARED Miner - CNP   ARIPiprazole (ABILIFY) 2 MG tablet Take 2 mg by mouth daily    Historical Provider, MD   OXcarbazepine (TRILEPTAL) 300 MG tablet Take 300 mg by mouth nightly as needed    Historical Provider, MD       REVIEW OF SYSTEMS    (2-9 systems for level 4, 10 or more for level 5)      Review of Systems   Constitutional: Negative for diaphoresis and fever. HENT: Negative for sore throat. Respiratory: Negative for cough, shortness of breath and wheezing. Cardiovascular: Negative for chest pain, palpitations and leg swelling. Gastrointestinal: Negative for abdominal pain, blood in stool, constipation, diarrhea, nausea and vomiting. Genitourinary: Positive for vaginal bleeding. Negative for dysuria, frequency and hematuria. Musculoskeletal: Negative for back pain and neck pain. Skin: Negative for rash. Neurological: Negative for dizziness and headaches. Hematological: Does not bruise/bleed easily. PHYSICAL EXAM   (up to 7 for level 4, 8 or more for level 5)      INITIAL VITALS:   /62   Pulse 105   Temp 98.2 °F (36.8 °C) (Oral)   Resp 16   Ht 5' 1\" (1.549 m)   Wt 165 lb (74.8 kg)   LMP 02/01/2019 (Approximate)   SpO2 97%   BMI 31.18 kg/m²     Physical Exam   Constitutional: She is oriented to person, place, and time. She appears well-developed and well-nourished. HENT:   Head: Normocephalic and atraumatic. Eyes: Pupils are equal, round, and reactive to light. Right eye exhibits no discharge. Left eye exhibits no discharge. Neck: Normal range of motion. Cardiovascular: Normal rate, regular rhythm and normal heart sounds. Exam reveals no gallop and no friction rub. No murmur heard. Pulmonary/Chest: No respiratory distress. She has no wheezes. She has no rales. She exhibits no tenderness. Abdominal: Soft. Bowel sounds are normal. She exhibits no distension and no mass. There is no tenderness. There is no rebound and no guarding. Musculoskeletal: Normal range of motion. She exhibits no edema, tenderness or deformity. Neurological: She is alert and oriented to person, place, and time. Skin: Skin is warm, dry and intact. No rash noted. She is not diaphoretic. No erythema. No pallor. Psychiatric: She has a normal mood and affect.  Her speech is normal and behavior is normal. Judgment and thought content normal. Cognition and memory are normal.       DIFFERENTIAL  DIAGNOSIS     PLAN (LABS / IMAGING / EKG):  Orders Placed This Encounter   Procedures    CBC Auto Differential    ABO/RH    TYPE AND SCREEN    Rho(D) immune globulin (RhoGAM) injection only    MI RHO D IMMUNE GLOBULIN INJ    MI INJECTION,THERAP/PROPH/DIAGNOST, IM OR SUBCUT MEDICATIONS ORDERED:  Orders Placed This Encounter   Medications    rho(D) immune globulin (HYPERRHO S/D) injection 300 mcg           DIAGNOSTIC RESULTS / EMERGENCY DEPARTMENT COURSE / MDM     LABS:  Results for orders placed or performed during the hospital encounter of 06/04/19   CBC Auto Differential   Result Value Ref Range    WBC 9.7 4.5 - 13.5 k/uL    RBC 3.90 (L) 4.0 - 5.2 m/uL    Hemoglobin 12.2 12.0 - 16.0 g/dL    Hematocrit 33.6 (L) 36 - 46 %    MCV 86.1 80 - 100 fL    MCH 31.3 26 - 34 pg    MCHC 36.3 31 - 37 g/dL    RDW 12.6 11.5 - 14.9 %    Platelets 925 671 - 389 k/uL    MPV 7.9 6.0 - 12.0 fL    NRBC Automated NOT REPORTED per 100 WBC    Differential Type NOT REPORTED     Seg Neutrophils 71 (H) 34 - 64 %    Lymphocytes 21 (L) 25 - 45 %    Monocytes 7 2 - 8 %    Eosinophils % 1 0 - 4 %    Basophils 0 0 - 2 %    Immature Granulocytes NOT REPORTED 0 %    Segs Absolute 6.90 1.3 - 9.1 k/uL    Absolute Lymph # 2.00 1.2 - 5.2 k/uL    Absolute Mono # 0.70 0.1 - 1.3 k/uL    Absolute Eos # 0.10 0.0 - 0.4 k/uL    Basophils # 0.00 0.0 - 0.2 k/uL    Absolute Immature Granulocyte NOT REPORTED 0.00 - 0.30 k/uL    WBC Morphology NOT REPORTED     RBC Morphology NOT REPORTED     Platelet Estimate NOT REPORTED    ABO/RH   Result Value Ref Range    ABO/Rh A NEGATIVE    TYPE AND SCREEN   Result Value Ref Range    Expiration Date 06/07/2019     Arm Band Number NOT REPORTED     ABO/Rh A NEGATIVE     Antibody Screen NEGATIVE    RHOGAM INJECTION ONLY   Result Value Ref Range    Unit Number [id number]     Product Code RHIG     Unit Divison 00     Dispense Status ISSUED     Transfusion Status OK TO TRANSFUSE        RADIOLOGY:     No results found. TRANSABDOMINAL ULTRASOUND (INTRAUTERINE PREGNANCY):    A limited, bedside pelvic ultrasound was performed using a transabdominal probe. The medical necessity was to evaluate for signs of an intrauterine versus ectopic pregnancy.  The structures studied were the uterus and its contents, bladder, ovaries, vesicouterine space, and rectouterine space. FINDINGS:  Evidence for an IUP was seen. 17 week fetus with cardiac motion  The study was technically adequate. IMAGES:  Electronically uploaded to the PACS system        MDM/EMERGENCY DEPARTMENT COURSE:      ED Course as of Jun 04 1903 Tue Jun 04, 2019   1558 No tenderness on abdominal exam, bedside ultrasound reveals baby moving appropriately heart rate 157. [KW]   1729 ABO Rh: A NEGATIVE [KW]   3735 Patient is Rh- as well as antibody negative based on review of chart, will give program at this time. [KW]   1903 Patient instructed to follow up outpatient with Dr. Kameron borges her ObGyn. Patient was in agreement with this plan stable at time of discharge. [KW]      ED Course User Index  [KW] Jenn Rosen DO           PROCEDURES:  None    CONSULTS:  None    CRITICAL CARE:  None    FINAL IMPRESSION      1.  Vaginal bleeding          DISPOSITION / PLAN     DISPOSITION Decision To Discharge    PATIENT REFERRED TO:  Koffi Jeronimo, [address] [facility]  [address] [address]  [phone]    Schedule an appointment as soon as possible for a visit   For Follow Up    [facility] [address] [place] [address]  [phone]    Schedule an appointment as soon as possible for a visit   For Follow Up      DISCHARGE MEDICATIONS:  New Prescriptions    No medications on file       Jenn Rosen DO  Emergency Medicine Resident    (Please note that portions of this note were completed with a voicerecognition program.  Efforts were made to edit the dictations but occasionally words are mis-transcribed.)       Jenn Rosen DO  06/04/19 1903

## 2019-06-04 NOTE — ED NOTES
We will watch the Pt for any adverse SE's for about 20 minutes. Writer educated the Pt on the most common SE's and also the severe reactions to this injection. PVU of same.       Pietro Levi RN  06/04/19 4333

## 2019-06-04 NOTE — LETTER
[address] [facility] [place] [address]  Phone: [phone]             June 4, 2019    Patient: Yusuf Chaudhary   YOB: 2000   Date of Visit: 6/4/2019       To Whom It May Concern: Stevie Crawley was seen and treated in our emergency department on 6/4/2019. Pt's boyfriend, Pablo Monae, has been with her for the entire visit today.       Sincerely,             Signature:__________________________________

## 2019-06-04 NOTE — ED NOTES
Received a call from the Lab and they will be tubing up the [facility] now.       Karen Landry RN  06/04/19 4000

## 2019-06-05 LAB
BLD PROD TYP BPU: NORMAL
DISPENSE STATUS BLOOD BANK: NORMAL
TRANSFUSION STATUS: NORMAL
UNIT DIVISION: 0
UNIT NUMBER: NORMAL

## 2019-06-06 ENCOUNTER — ROUTINE PRENATAL (OUTPATIENT)
Dept: OBGYN CLINIC | Age: 19
End: 2019-06-06

## 2019-06-06 ENCOUNTER — HOSPITAL ENCOUNTER (OUTPATIENT)
Age: 19
Setting detail: SPECIMEN
Discharge: HOME | End: 2019-06-06
Payer: COMMERCIAL

## 2019-06-06 VITALS
BODY MASS INDEX: 30.61 KG/M2 | SYSTOLIC BLOOD PRESSURE: 127 MMHG | HEART RATE: 87 BPM | WEIGHT: 162 LBS | DIASTOLIC BLOOD PRESSURE: 80 MMHG

## 2019-06-06 DIAGNOSIS — Z34.02 ENCOUNTER FOR SUPERVISION OF NORMAL FIRST PREGNANCY IN SECOND TRIMESTER: ICD-10-CM

## 2019-06-06 DIAGNOSIS — Z86.19 HX OF CHLAMYDIA INFECTION: ICD-10-CM

## 2019-06-06 DIAGNOSIS — Z3A.17 17 WEEKS GESTATION OF PREGNANCY: Primary | ICD-10-CM

## 2019-06-06 PROCEDURE — 0502F SUBSEQUENT PRENATAL CARE: CPT | Performed by: SPECIALIST

## 2019-06-06 RX ORDER — FLUCONAZOLE 100 MG/1
100 TABLET ORAL DAILY
Qty: 7 TABLET | Refills: 0 | Status: SHIPPED | OUTPATIENT
Start: 2019-06-06 | End: 2019-06-13

## 2019-06-06 RX ORDER — METRONIDAZOLE 500 MG/1
500 TABLET ORAL 2 TIMES DAILY
Qty: 14 TABLET | Refills: 0 | Status: SHIPPED | OUTPATIENT
Start: 2019-06-06 | End: 2019-06-13

## 2019-06-06 RX ORDER — METRONIDAZOLE 500 MG/1
500 TABLET ORAL 2 TIMES DAILY
Qty: 14 TABLET | Refills: 0 | Status: SHIPPED | OUTPATIENT
Start: 2019-06-06 | End: 2019-06-06 | Stop reason: CLARIF

## 2019-06-06 RX ORDER — FLUCONAZOLE 100 MG/1
100 TABLET ORAL DAILY
Qty: 7 TABLET | Refills: 0 | Status: SHIPPED | OUTPATIENT
Start: 2019-06-06 | End: 2019-06-06 | Stop reason: CLARIF

## 2019-06-06 NOTE — PROGRESS NOTES
agree to the above documentation placed by my scribe Jeronimo Sampson. I reviewed the scribe's note and agree with the documented findings and plan of care. Any areas of disagreement are noted on the chart. I have personally evaluated this patient. Additional findings are as noted. I agree with the chief complaint, past medical history, past surgical history, allergies, medications, social and family history as documented unless otherwise noted below.      Electronically signed by Jocelyn Beckwith MD on 6/10/2019 at 8:36 AM English

## 2019-06-06 NOTE — PATIENT INSTRUCTIONS
Patient Education        Weeks 14 to 25 of Your Pregnancy: Care Instructions  Your Care Instructions    During this time, you may start to \"show,\" so that you look pregnant to people around you. You may also notice some changes in your skin, such as itchy spots on your palms or acne on your face. Your baby is now able to pass urine, and your baby's first stool (meconium) is starting to collect in his or her intestines. Hair is also beginning to grow on your baby's head. At your next visit, between weeks 18 and 20, your doctor may do an ultrasound test. The test allows your doctor to check for certain problems. Your doctor can also tell the sex of your baby. This is a good time to think about whether you want to know whether your baby is a boy or a girl. Talk to your doctor about getting a flu shot to help keep you healthy during your pregnancy. As your pregnancy moves along, it is common to worry or feel anxious. Your body is changing a lot. And you are thinking about giving birth, the health of your baby, and becoming a parent. You can learn to cope with any anxiety and stress you feel. Follow-up care is a key part of your treatment and safety. Be sure to make and go to all appointments, and call your doctor if you are having problems. It's also a good idea to know your test results and keep a list of the medicines you take. How can you care for yourself at home?   Reduce stress    · Ask for help with cooking and housekeeping.     · Figure out who or what causes your stress. Avoid these people or situations as much as possible.     · Relax every day. Taking 10- to 15-minute breaks can make a big difference. Take a walk, listen to music, or take a warm bath.     · Learn relaxation techniques at prenatal or yoga class. Or buy a relaxation tape.     · List your fears about having a baby and becoming a parent. Share the list with someone you trust. Decide which worries are really small, and try to let them go. Exercise    · If you did not exercise much before pregnancy, start slowly. Walking is best. [employer], and do a little more every day.     · Brisk walking, easy jogging, low-impact aerobics, water aerobics, and yoga are good choices. Some sports, such as scuba diving, horseback riding, downhill skiing, gymnastics, and water skiing, are not a good idea.     · Try to do at least 2½ hours a week of moderate exercise, such as a fast walk. One way to do this is to be active 30 minutes a day, at least 5 days a week. It's fine to be active in blocks of 10 minutes or more throughout your day and week.     · Wear loose clothing. And wear shoes and a bra that provide good support.     · Warm up and cool down to start and finish your exercise.     · If you want to use weights, be sure to use light weights. They reduce stress on your joints.    Stay at the best weight for you    · Experts recommend that you gain about 1 pound a month during the first 3 months of your pregnancy.     · Experts recommend that you gain about 1 pound a week during your last 6 months of pregnancy, for a total weight gain of 25 to 35 pounds.     · If you are underweight, you will need to gain more weight (about 28 to 40 pounds).     · If you are overweight, you may not need to gain as much weight (about 15 to 25 pounds).     · If you are gaining weight too fast, use common sense. Exercise every day, and limit sweets, fast foods, and fats. Choose lean meats, fruits, and vegetables.     · If you are having twins or more, your doctor may refer you to a dietitian. Where can you learn more? Go to [email or web address] org and sign in to your [employer] account. Enter F562 in the [employer] box to learn more about \"Weeks 14 to 18 of Your Pregnancy: Care Instructions. \"     If you do not have an account, please click on the \"Sign Up Now\" link.   Current as of: September 5, 2018  Content Version: 12.0  © 1035-8844 Healthwise, [email or web address] and sign in to your miacosa account. Enter S833 in the [employer] box to learn more about \"Learning About When to Call Your Doctor During Pregnancy (Up to 20 Weeks). \"     If you do not have an account, please click on the \"Sign Up Now\" link. Current as of: September 5, 2018  Content Version: 12.0  © 3824-0484 Healthwise, Incorporated. Care instructions adapted under license by [facility] ([facility]). If you have questions about a medical condition or this instruction, always ask your healthcare professional. [address] any warranty or liability for your use of this information.

## 2019-06-07 LAB
C TRACH DNA GENITAL QL NAA+PROBE: NEGATIVE
N. GONORRHOEAE DNA: NEGATIVE
SPECIMEN DESCRIPTION: NORMAL

## 2019-06-12 ENCOUNTER — HOSPITAL ENCOUNTER (OUTPATIENT)
Age: 19
Setting detail: SPECIMEN
Discharge: HOME | End: 2019-06-12
Payer: COMMERCIAL

## 2019-06-12 ENCOUNTER — ROUTINE PRENATAL (OUTPATIENT)
Dept: OBGYN CLINIC | Age: 19
End: 2019-06-12

## 2019-06-12 VITALS
WEIGHT: 161.6 LBS | SYSTOLIC BLOOD PRESSURE: 126 MMHG | HEART RATE: 97 BPM | BODY MASS INDEX: 30.53 KG/M2 | DIASTOLIC BLOOD PRESSURE: 81 MMHG

## 2019-06-12 DIAGNOSIS — Z34.02 ENCOUNTER FOR SUPERVISION OF NORMAL FIRST PREGNANCY IN SECOND TRIMESTER: ICD-10-CM

## 2019-06-12 DIAGNOSIS — Z34.02 ENCOUNTER FOR SUPERVISION OF NORMAL FIRST PREGNANCY IN SECOND TRIMESTER: Primary | ICD-10-CM

## 2019-06-12 DIAGNOSIS — Z3A.17 17 WEEKS GESTATION OF PREGNANCY: ICD-10-CM

## 2019-06-12 PROCEDURE — 0502F SUBSEQUENT PRENATAL CARE: CPT | Performed by: SPECIALIST

## 2019-06-12 NOTE — PROGRESS NOTES
Mat Callaway is a 22 y.o. female 12w11d        OB History    Para Term  AB Living   1             SAB TAB Ectopic Molar Multiple Live Births                    # Outcome Date GA Lbr Wayne/2nd Weight Sex Delivery Anes PTL Lv   1 Current                Chief Complaint   Patient presents with    Routine Prenatal Visit     Patient is 17 weeks and 6 days gestation and is here for supervision of normal pregnancy. Blood pressure 126/81, pulse 97, weight 161 lb 9.6 oz (73.3 kg), last menstrual period 2019, not currently breastfeeding. The patient was seen and evaluated. There was N/A fetal movements. No contractions or leakage of fluid. Signs and symptoms of  labor as well as labor were reviewed. Dates were reviewed with the patient. The patient will return to the office for her next visit in 4 weeks. See antepartum flow sheet. Patient Active Problem List    Diagnosis Date Noted    Bipolar 1 disorder ([facility] [address] 10/08/2018    PCOS (polycystic ovarian syndrome) 2017    Deliberate self-cutting 2017    Need for Menactra vaccination 2016        Diagnosis Orders   1. Encounter for supervision of normal first pregnancy in second trimester  US OB 14 Plus Weeks Single or First Gestation    Maternal Screen 4    US OB Transvaginal   2. 17 weeks gestation of pregnancy  Maternal Screen 4    US OB Transvaginal     Patient doing well. Quad screening will be done today. Fetal heart rate auscultated at 161 bpm and fundal height is 19 cm. today. A second trimester ultrasound and cervical length ultrasound were ordered today and patient was advised to schedule an appointment to have these done between 21 to 22 gestational weeks. Patient advised to continue taking prenatal vitamins and to rest as necessary. Leonides Benavides, nina scribing for, and in the presence of Dr. Blaine Gonzalez.    Electronically signed by: Dariana Olvera 19 2:26 PM   I agree to the above documentation placed by my scribe Rosalia Tucker. I reviewed the scribe's note and agree with the documented findings and plan of care. Any areas of disagreement are noted on the chart. I have personally evaluated this patient. Additional findings are as noted. I agree with the chief complaint, past medical history, past surgical history, allergies, medications, social and family history as documented unless otherwise noted below.      Electronically signed by Claudio Hartmann MD on 6/13/2019 at 1:37 AM

## 2019-06-15 LAB
AFP INTERPRETATION: NORMAL
AFP MOM: 0.9
AFP QUAD INTERPRETATION: NORMAL
AFP SPECIMEN: NORMAL
AFP: 37 NG/ML
DATE OF BIRTH: NORMAL
DATING METHOD: NORMAL
DETERMINED BY: NORMAL
DIABETIC: NORMAL
DIMERIC INHIBIN A: 313 PG/ML
DUE DATE: NORMAL
ESTIMATED DUE DATE: NORMAL
FAMILY HISTORY NTD: NORMAL
GESTATIONAL AGE: NORMAL
HISTORY OF ANEUPLOIDY?: NO
IN VITRO FERTILIZATION: NORMAL
INHIBIN A MOM: 1.47
INSULIN REQ DIABETES: NO
LAST MENSTRUAL PERIOD: NORMAL
MATERNAL AGE AT EDD: 20.2 YR
MATERNAL WEIGHT: 161
MOM FOR HCG, 2ND TRIMESTER: 4.01
MONOCHORIONIC TWINS: NORMAL
NUMBER OF FETUSES: NORMAL
PATIENT WEIGHT UNITS: NORMAL
PATIENT WEIGHT: NORMAL
PATIENT'S HCG, TRI 2: NORMAL IU/L
PREV TRISOMY PREG: NORMAL
RACE (MATERNAL): NORMAL
RACE: NORMAL
REPEAT SPECIMEN?: NO
SMOKING: NORMAL
SMOKING: YES
UE3 MOM: 0.52
UE3 VALUE: 0.7 NG/ML
VALPROIC/CARBAMAZEP: NORMAL
ZZ NTE CLEAN UP: HISTORY: NO

## 2019-06-19 ENCOUNTER — HOSPITAL ENCOUNTER (EMERGENCY)
Age: 19
Discharge: HOME | End: 2019-06-20
Attending: EMERGENCY MEDICINE
Payer: COMMERCIAL

## 2019-06-19 ENCOUNTER — TELEPHONE (OUTPATIENT)
Dept: OBGYN CLINIC | Age: 19
End: 2019-06-19

## 2019-06-19 DIAGNOSIS — O46.90 VAGINAL BLEEDING IN PREGNANCY: Primary | ICD-10-CM

## 2019-06-19 DIAGNOSIS — O26.891 RH NEGATIVE, ANTEPARTUM, FIRST TRIMESTER: ICD-10-CM

## 2019-06-19 DIAGNOSIS — O28.0 ABNORMAL QUAD SCREEN: Primary | ICD-10-CM

## 2019-06-19 DIAGNOSIS — Z67.91 RH NEGATIVE, ANTEPARTUM, FIRST TRIMESTER: ICD-10-CM

## 2019-06-19 LAB — ABO/RH: NORMAL

## 2019-06-19 PROCEDURE — 86901 BLOOD TYPING SEROLOGIC RH(D): CPT

## 2019-06-19 PROCEDURE — 36415 COLL VENOUS BLD VENIPUNCTURE: CPT

## 2019-06-19 PROCEDURE — 6360000002 HC RX W HCPCS: Performed by: EMERGENCY MEDICINE

## 2019-06-19 PROCEDURE — 96372 THER/PROPH/DIAG INJ SC/IM: CPT

## 2019-06-19 PROCEDURE — 86900 BLOOD TYPING SEROLOGIC ABO: CPT

## 2019-06-19 PROCEDURE — 99284 EMERGENCY DEPT VISIT MOD MDM: CPT

## 2019-06-19 RX ADMIN — HUMAN RHO(D) IMMUNE GLOBULIN 300 MCG: 1500 SOLUTION INTRAMUSCULAR; INTRAVENOUS at 23:35

## 2019-06-19 ASSESSMENT — ENCOUNTER SYMPTOMS
RHINORRHEA: 0
EYE REDNESS: 0
NAUSEA: 0
BACK PAIN: 0
COUGH: 0
VOMITING: 0
SORE THROAT: 0
EYE PAIN: 0
ABDOMINAL PAIN: 0
DIARRHEA: 0
SHORTNESS OF BREATH: 0

## 2019-06-19 NOTE — TELEPHONE ENCOUNTER
6/17/2019 Patient called and spoke to Demetrio Puckett regarding positive quad screen. Patient informed of results and informed she will be referred to [facility]. Left message for patient to return call to Karla Kelsey to discuss lab results.

## 2019-06-20 VITALS
HEIGHT: 61 IN | OXYGEN SATURATION: 95 % | SYSTOLIC BLOOD PRESSURE: 112 MMHG | TEMPERATURE: 98.2 F | RESPIRATION RATE: 16 BRPM | WEIGHT: 161 LBS | DIASTOLIC BLOOD PRESSURE: 58 MMHG | HEART RATE: 95 BPM | BODY MASS INDEX: 30.4 KG/M2
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
## 2019-06-21 ENCOUNTER — ROUTINE PRENATAL (OUTPATIENT)
Dept: OBGYN CLINIC | Age: 19
End: 2019-06-21
Payer: COMMERCIAL

## 2019-06-21 VITALS
BODY MASS INDEX: 30.76 KG/M2 | SYSTOLIC BLOOD PRESSURE: 106 MMHG | WEIGHT: 162.8 LBS | HEART RATE: 83 BPM | DIASTOLIC BLOOD PRESSURE: 65 MMHG

## 2019-06-21 DIAGNOSIS — Z3A.19 19 WEEKS GESTATION OF PREGNANCY: ICD-10-CM

## 2019-06-21 DIAGNOSIS — O28.0 ABNORMAL QUAD SCREEN: Primary | ICD-10-CM

## 2019-06-21 DIAGNOSIS — O20.9 VAGINAL BLEEDING BEFORE 22 WEEKS GESTATION: ICD-10-CM

## 2019-06-21 DIAGNOSIS — Z34.02 ENCOUNTER FOR SUPERVISION OF NORMAL FIRST PREGNANCY IN SECOND TRIMESTER: ICD-10-CM

## 2019-06-21 PROCEDURE — 99213 OFFICE O/P EST LOW 20 MIN: CPT | Performed by: SPECIALIST

## 2019-06-21 NOTE — PROGRESS NOTES
Latonya Mercedes is a 22 y.o. female 24w2d        OB History    Para Term  AB Living   1             SAB TAB Ectopic Molar Multiple Live Births                    # Outcome Date GA Lbr Wayne/2nd Weight Sex Delivery Anes PTL Lv   1 Current                Chief Complaint   Patient presents with    Routine Prenatal Visit     Patient is here for ER follow up. Patient states she went to the hospital for vaginal bleeding. Patient reports no active bleeding at this time. Blood pressure 106/65, pulse 83, weight 162 lb 12.8 oz (73.8 kg), last menstrual period 2019, not currently breastfeeding. The patient was seen and evaluated. There was positive fetal movements. No contractions or leakage of fluid. Signs and symptoms of  labor as well as labor were reviewed. The patient's anatomy ultrasound has been scheduled. The S/S of Pre-Eclampsia were reviewed with the patient in detail. She is to report any of these if they occur. She currently denies any of these. The patient is RH negative Rhogam Ordered: Not indicated. Patient Active Problem List    Diagnosis Date Noted    [facility]    PCOS (polycystic ovarian syndrome) 2017    Deliberate self-cutting 2017    Need for Menactra vaccination 2016        Diagnosis Orders   1. Abnormal quad screen     2. Encounter for supervision of normal first pregnancy in second trimester     3. Vaginal bleeding before 22 weeks gestation         Patient states that she was evaluated at the hospital for vaginal bleeding and she is concerned because she has received another injection of Rhogam.  Patient denies any further bleeding. Explained to patient the function of Rhogam and protects future pregnancies. Reassurance provided that Rhogam does not hurt her or the baby and that studies will be done at 28 gestational weeks and another injection may be provided at that time.   All questions were answered and the importance of Rhogam was reinforced. Patient was advised to always go to the hospital if she has vaginal bleeding. Patient with abnormal quad screen. She has been referred to [facility] for [facility] evaluation. Fetal heart rate auscultated at 154 bpm and fundal height is 18 cm. today. Patient advised to continue taking prenatal vitamins and to rest as necessary. The patient will return to the office for her next visit in 1 week. See antepartum flow sheet. Fifi Paci, am scribing for, and in the presence of Dr. Maria Del Rosario Morel. Electronically signed by: Dwight Rene 6/21/19 10:23 AM   I agree to the above documentation placed by my scribe Dwight Rene. I reviewed the scribe's note and agree with the documented findings and plan of care. Any areas of disagreement are noted on the chart. I have personally evaluated this patient. Additional findings are as noted. I agree with the chief complaint, past medical history, past surgical history, allergies, medications, social and family history as documented unless otherwise noted below.      Electronically signed by Maria Del Rosario Morel MD on 6/22/2019 at 6:14 AM

## 2019-06-21 NOTE — PATIENT INSTRUCTIONS
genital or rectal areas, or legs.     · You have a new loss of bowel or bladder control.    Watch closely for changes in your health, and be sure to contact your doctor if:    · You do not get better as expected. Where can you learn more? Go to https://fadia.healthBitfury Group. org and sign in to your Deep Imaging Technologies account. Enter K928 in the eTruckBiz.com box to learn more about \"Backache During Pregnancy: Care Instructions. \"     If you do not have an account, please click on the \"Sign Up Now\" link. Current as of: September 5, 2018  Content Version: 12.0  © 7365-1622 NeuralStem. Care instructions adapted under license by [facility] ([facility]). If you have questions about a medical condition or this instruction, always ask your healthcare professional. [address] any warranty or liability for your use of this information. Patient Education        Round Ligament Pain: Care Instructions  Your Care Instructions    Round ligament pain is a common pain during pregnancy. You may feel a sharp brief pain on one or both sides of your belly. It may go down into your groin. It's usually felt for the first time during the second trimester. This pain is a normal part of pregnancy. It will go away as your pregnancy continues or after your baby is born. Your uterus is supported by two ligaments that go from the top and sides of the uterus to the bones of the pelvis. These are the round ligaments. As your uterus grows, these ligaments stretch and tighten with your movements. This may be the cause of the pain. You may find that certain activities seem to cause pain. If you can, avoid those activities. Your doctor can usually diagnose round ligament pain from your symptoms and an exam. If you have bleeding or other symptoms, your doctor may also do an imaging test, such as an ultrasound.  Your doctor may suggest that you take an over-the-counter pain medicine, such as acetaminophen. Follow-up care is a key part of your treatment and safety. Be sure to make and go to all appointments, and call your doctor if you are having problems. It's also a good idea to know your test results and keep a list of the medicines you take. How can you care for yourself at home? · If certain movements seem to trigger the pain, see if you can avoid them while you are pregnant. · Stay active. If your doctor says it's okay, try moderate exercise. Many pregnant women find that water exercise is most comfortable. Examples are swimming and water aerobics. · Ask your doctor about taking acetaminophen for pain. Be safe with medicines. Read and follow all instructions on the label. When should you call for help? Call your doctor now or seek immediate medical care if:    · You think you might be in labor.     · You have new or worse pain.    Watch closely for changes in your health, and be sure to contact your doctor if you have any problems. Where can you learn more? Go to https://JAB Broadband.Kythera Biopharmaceuticals. org and sign in to your RealityMine account. Enter R110 in the Edustation.me box to learn more about \"Round Ligament Pain: Care Instructions. \"     If you do not have an account, please click on the \"Sign Up Now\" link. Current as of: September 5, 2018  Content Version: 12.0  © 6311-0709 Healthwise, Incorporated. Care instructions adapted under license by [facility] ([facility]). If you have questions about a medical condition or this instruction, always ask your healthcare professional. [place] [address] any warranty or liability for your use of this information. Patient Education        Learning About Rh Immunoglobulin Shots  Introduction    An Rh immunoglobulin shot is given to pregnant women who have Rh-negative blood. You may have Rh-negative blood, and your baby may have Rh-positive blood. If the two types of blood mix, your body will make antibodies.  This is called Rh sensitization. Most of the time, this is not a problem the first time you're pregnant. But it could cause problems in future pregnancies. This shot keeps your body from making the antibodies. You get the shot around 28 weeks of pregnancy. After the birth, your baby's blood is tested. If the blood is Rh positive, you will get another shot. You may also get the shot if you have vaginal bleeding while you are pregnant or if you have a miscarriage. These shots protect future pregnancies. Women with Rh negative blood will need this shot each time they get pregnant. Example  · Rh immunoglobulin (HypRho-D, MICRhoGAM, and RhoGAM)  Possible side effects  Rare side effects may include:  · Some mild pain where you got the shot. · A slight fever. · An allergic reaction. You may have other side effects not listed here. Check the information that comes with your medicine. What to know about taking this medicine  · You may need more than one shot. You may need the shot again:  ? After amniocentesis, fetal blood sampling, or chorionic villus sampling tests. ? If you have bleeding in your second or third trimester. ? After turning of a breech baby. ? After an injury to the belly while you are pregnant. ? After a miscarriage or an . ? Before or right after treatment for an ectopic or a partial molar pregnancy. · Tell your doctor if you have any allergies or have had a bad response to medicines in the past.  · If you get this shot within 3 months of getting a live-virus vaccine, the vaccine may not work. Your doctor will tell you if you need more vaccine. · Check with your doctor or pharmacist before you use any other medicines. This includes over-the-counter medicines. Make sure your doctor knows all of the medicines, vitamins, herbs, and supplements you take. Taking some medicines at the same time can cause problems. Where can you learn more? Go to https://chpepiceweb.health-[email or web address] org and sign in to your Rollbase (acquired by Progress Software) account. Enter V599 in the [facility] box to learn more about \"Learning About Rh Immunoglobulin Shots. \"     If you do not have an account, please click on the \"Sign Up Now\" link. Current as of: 2018  Content Version: 12.0  © 9925-1407 [email or web address]. Care instructions adapted under license by [facility] ([facility]). If you have questions about a medical condition or this instruction, always ask your healthcare professional. [address] any warranty or liability for your use of this information. Patient Education        Learning About Preventing Rh Disease  What is Rh disease? Rh disease can be a serious problem in pregnancy. It happens when substances called antibodies in the mother's blood destroy red blood cells in her baby's blood. This can occur when the blood types of a mother and her baby do not match. All blood has an Rh factor. This is what makes a blood type positive or negative. When you are Rh-negative, your baby may be Rh-negative or Rh-positive. If your baby has Rh-positive blood and it mixes with yours, your body will make antibodies. This is called Rh sensitization. Most of the time, this is not a problem in a first pregnancy. But in future pregnancies, it could cause Rh disease. A  with Rh disease has mild anemia and may have jaundice. In severe cases, anemia, jaundice, and swelling can be very dangerous or fatal. Some babies need to be delivered early. Some need special care in the NICU. A very sick baby will need a blood transfusion before or after birth. Fortunately, Rh sensitization is usually easy to prevent. That's why it's important to get your Rh status checked in your first trimester. It doesn't cause any warning signs. A blood test is the only way to know if you are Rh-sensitive or are at risk for it. How can you prevent Rh disease?   If you are Rh-negative, your doctor gives you an Rh immune globulin shot (such as RhoGAM). It helps prevent your body from making the antibodies that attack your baby's red blood cells. Timing is important. You need the shot at certain times during your pregnancy. And you need one anytime there is a chance that your baby's blood might mix with yours. That can happen with certain prenatal tests or when you have pregnancy bleeding, such as:  · Right after any pregnancy loss, amniocentesis, or CVS testing. · After turning of a breech baby. · Before and maybe after childbirth. Your doctor gives you a shot around week 28. If your  is Rh-positive, you will have another shot. Follow-up care is a key part of your treatment and safety. Be sure to make and go to all appointments, and call your doctor if you are having problems. It's also a good idea to know your test results and keep a list of the medicines you take. Where can you learn more? Go to https://Groupon.vChatter. org and sign in to your EMUZE account. Enter R454 in the ServiceGems box to learn more about \"Learning About Preventing Rh Disease. \"     If you do not have an account, please click on the \"Sign Up Now\" link. Current as of: 2018  Content Version: 12.0  © 9707-5018 Healthwise, Incorporated. Care instructions adapted under license by [facility] ([facility]). If you have questions about a medical condition or this instruction, always ask your healthcare professional. [place] [address] any warranty or liability for your use of this information. Patient Education        Rh Antibodies Screening: About This Test  What is it? The Rh test is a blood test. It checks your blood for a marker called Rh. If you have Rh-negative blood and your baby has Rh-positive blood, your immune system may make antibodies that can harm your baby. When a pregnant woman has these antibodies, it is called Rh sensitization. Why is this test done?   The Rh test is done in early pregnancy to find out if your baby is at risk for Rh disease. This can happen if you have Rh-negative blood and your baby has Rh-positive blood. If your Rh-negative blood mixes with Rh-positive blood, your immune system will make antibodies to attack the Rh-positive blood. During pregnancy, these antibodies could attach to the baby's red blood cells. This can cause your baby to have serious health problems. How can you prepare for the test?  You do not need to do anything before you have this test.  What happens during the test?  A health professional takes a sample of your blood. What else should you know about the test?  Rh sensitization does not cause any warning symptoms. A blood test is the only way to know if you are at risk for it. · If you are at risk:  ? Your doctor will give you one or more shots of Rh immune globulin (such as RhoGAM). ? You will need to get another shot each time you get pregnant. · If you already have antibodies in your blood:  ? You will have regular tests to see how your baby is doing. ? You may also see a doctor who specializes in high-risk pregnancies. What happens after the test?  · You will probably be able to go home right away. · You can go back to your usual activities right away. When should you call for help? Watch closely for changes in your health, and be sure to contact your doctor if you have any problems. Follow-up care is a key part of your treatment and safety. Be sure to make and go to all appointments, and call your doctor if you are having problems. It's also a good idea to keep a list of the medicines you take. Ask your doctor when you can expect to have your test results. Where can you learn more? Go to https://chpeprinceewdaron.Purch. org and sign in to your iovox account.  Enter L790 in the Huzco box to learn more about \"Rh Antibodies Screening: About This Test.\"     If you do not have an account, please click on the \"Sign Up Now\" harm your baby. Ease leg cramps  · Do not massage your calf during the cramp. · Sit on a firm bed or chair. Straighten your leg, and bend your foot (flex your ankle) slowly upward, toward your knee. Bend your toes up and down. · Stand on a cool, flat surface. Stretch your toes upward, and take small steps walking on your heels. · Use a heating pad or hot water bottle to help with muscle ache. Prevent leg cramps  · Be sure to get enough calcium. If you are worried that you are not getting enough, talk to your doctor. · Exercise every day, and stretch your legs before bed. · Take a warm bath before bed, and try leg warmers at night. Where can you learn more? Go to https://Naow.Meta Pharmaceutical Services. org and sign in to your CityFibre account. Enter T527 in the Roost box to learn more about \"Weeks 18 to 22 of Your Pregnancy: Care Instructions. \"     If you do not have an account, please click on the \"Sign Up Now\" link. Current as of: September 5, 2018  Content Version: 12.0  © 7233-0253 Healthwise, Jott. Care instructions adapted under license by [facility] ([facility]). If you have questions about a medical condition or this instruction, always ask your healthcare professional. [address] any warranty or liability for your use of this information. Patient Education        Learning About When to Call Your Doctor During Pregnancy (After 20 Weeks)  Your Care Instructions  It's common to have concerns about what might be a problem during pregnancy. Although most pregnant women don't have any serious problems, it's important to know when to call your doctor if you have certain symptoms or signs of labor. These are general suggestions. Your doctor may give you some more information about when to call. When to call your doctor (after 20 weeks)  Call 911 anytime you think you may need emergency care. For example, call if:  · You have severe vaginal bleeding.   · You have and keep a list of the medicines you take. Where can you learn more? Go to https://chpepiceweb.healthVitamin Research Products. org and sign in to your Lonestar Heartt account. Enter  in the [facility] box to learn more about \"Learning About When to Call Your Doctor During Pregnancy (After 20 Weeks). \"     If you do not have an account, please click on the \"Sign Up Now\" link. Current as of: September 5, 2018  Content Version: 12.0  © 8350-0446 Healthwise, Incorporated. Care instructions adapted under license by [facility] ([facility]). If you have questions about a medical condition or this instruction, always ask your healthcare professional. [address] any warranty or liability for your use of this information.

## 2019-07-15 ENCOUNTER — ROUTINE PRENATAL (OUTPATIENT)
Dept: OBGYN CLINIC | Age: 19
End: 2019-07-15

## 2019-07-15 VITALS
BODY MASS INDEX: 30.57 KG/M2 | DIASTOLIC BLOOD PRESSURE: 78 MMHG | HEART RATE: 88 BPM | SYSTOLIC BLOOD PRESSURE: 129 MMHG | WEIGHT: 161.8 LBS

## 2019-07-15 DIAGNOSIS — Z34.02 ENCOUNTER FOR SUPERVISION OF NORMAL FIRST PREGNANCY IN SECOND TRIMESTER: Primary | ICD-10-CM

## 2019-07-15 DIAGNOSIS — Z3A.22 22 WEEKS GESTATION OF PREGNANCY: ICD-10-CM

## 2019-07-15 PROCEDURE — 0502F SUBSEQUENT PRENATAL CARE: CPT | Performed by: SPECIALIST

## 2019-07-15 NOTE — PATIENT INSTRUCTIONS
more sessions each day. Ease or reduce swelling in your feet, ankles, hands, and fingers  · If your fingers are puffy, take off your rings. · Do not eat high-salt foods, such as potato chips. · Prop up your feet on a stool or couch as much as possible. Sleep with pillows under your feet. · Do not stand for long periods of time or wear tight shoes. · Wear support stockings. Where can you learn more? Go to https://TetraLogic Pharmaceuticals.Kite.ly. org and sign in to your PixelFlow account. Enter G264 in the Survela box to learn more about \"Weeks 22 to 26 of Your Pregnancy: Care Instructions. \"     If you do not have an account, please click on the \"Sign Up Now\" link. Current as of: September 5, 2018  Content Version: 12.0  © 3770-4885 Healthwise, Pongo Resume. Care instructions adapted under license by [facility] ([facility]). If you have questions about a medical condition or this instruction, always ask your healthcare professional. [place] [address] any warranty or liability for your use of this information. Patient Education        Learning About When to Call Your Doctor During Pregnancy (After 20 Weeks)  Your Care Instructions  It's common to have concerns about what might be a problem during pregnancy. Although most pregnant women don't have any serious problems, it's important to know when to call your doctor if you have certain symptoms or signs of labor. These are general suggestions. Your doctor may give you some more information about when to call. When to call your doctor (after 20 weeks)  Call 911 anytime you think you may need emergency care. For example, call if:  · You have severe vaginal bleeding. · You have sudden, severe pain in your belly. · You passed out (lost consciousness). · You have a seizure. · You see or feel the umbilical cord.   · You think you are about to deliver your baby and can't make it safely to the hospital.  Call your doctor now or seek immediate medical care if:  · You have vaginal bleeding. · You have belly pain. · You have a fever. · You have symptoms of preeclampsia, such as:  ? Sudden swelling of your face, hands, or feet. ? New vision problems (such as dimness or blurring). ? A severe headache. · You have a sudden release of fluid from your vagina. (You think your water broke.)  · You think that you may be in labor. This means that you've had at least 4 contractions within 20 minutes or at least 8 contractions in an hour. · You notice that your baby has stopped moving or is moving much less than normal.  · You have symptoms of a urinary tract infection. These may include:  ? Pain or burning when you urinate. ? A frequent need to urinate without being able to pass much urine. ? Pain in the flank, which is just below the rib cage and above the waist on either side of the back. ? Blood in your urine. Watch closely for changes in your health, and be sure to contact your doctor if:  · You have vaginal discharge that smells bad. · You have skin changes, such as:  ? A rash. ? Itching. ? Yellow color to your skin. · You have other concerns about your pregnancy. If you have labor signs at 37 weeks or more  If you have signs of labor at 37 weeks or more, your doctor may tell you to call when your labor becomes more active. Symptoms of active labor include:  · Contractions that are regular. · Contractions that are less than 5 minutes apart. · Contractions that are hard to talk through. Follow-up care is a key part of your treatment and safety. Be sure to make and go to all appointments, and call your doctor if you are having problems. It's also a good idea to know your test results and keep a list of the medicines you take. Where can you learn more? Go to https://chjames.[employer]. org and sign in to your V Wave account.  Enter  in the AskNshare box to learn more about \"Learning About When to Call Your Doctor During Pregnancy (After 20 Weeks). \"     If you do not have an account, please click on the \"Sign Up Now\" link. Current as of: September 5, 2018  Content Version: 12.0  © 8982-8240 Letsgofordinner. Care instructions adapted under license by [facility] ([facility]). If you have questions about a medical condition or this instruction, always ask your healthcare professional. [address] any warranty or liability for your use of this information. Patient Education        Learning About Glucose Testing During Pregnancy  What is a glucose test?    A glucose test measures the body's ability to use a type of sugar, called glucose. Glucose is the body's main source of energy. This test is used to check pregnant women for gestational diabetes. Gestational diabetes is a form of diabetes that develops during pregnancy and then usually goes away after the baby is born. When you have this condition, the insulin in your body is not able to keep your blood sugar in a normal range. If you do not control your blood sugar, your baby can grow too big and may have problems after birth. How is a glucose test done? There are different ways to test for gestational diabetes. One method is done in two steps. 1. You do not need to stop eating or drinking before the first step. You will drink a liquid that contains 50 grams of sugar (glucose). Your blood sample is taken 1 hour later. If you don't have a lot of sugar in your blood, you do not have gestational diabetes. 2. If you do have a lot of sugar in your blood, you are asked to do the second step, the oral glucose tolerance test (OGTT). With the OGTT, you cannot eat or drink for at least 8 hours before the test. Then a blood sample is taken when you arrive for the test. This is your fasting blood glucose value. It provides a baseline for comparing other glucose values. You drink a liquid that contains 100 grams of sugar (glucose).  Your blood sample is taken 3 hours later to see how much sugar is in your blood. If you don't have a lot of sugar in your blood, you don't have gestational diabetes. If you do have a lot of sugar in your blood, you may have gestational diabetes. A different method is done in one step. It is another version of the OGTT. You cannot eat or drink for at least 8 hours before the test. Then a blood sample is taken when you arrive for the test. This is your fasting blood glucose value. It provides a baseline for comparing other glucose values. You drink a liquid that contains 75 grams of sugar (glucose). Your blood sample is taken 1 and then 2 hours later to see how much sugar is in your blood. If you don't have a lot of sugar in your blood, you do not have gestational diabetes. If you do have a lot of sugar in your blood, you may have gestational diabetes. What else should you know about the test?  Your blood glucose level may drop very low toward the end of the glucose test. If this happens, you may feel weak, hungry, and restless. Tell your doctor if you have these symptoms. The test usually will be stopped. You may vomit after drinking the sweet liquid. If this happens, the test may need to be repeated at a later time. Your doctor may do more glucose tests at different times during your pregnancy. Follow-up care is a key part of your treatment and safety. Be sure to make and go to all appointments, and call your doctor if you are having problems. It's also a good idea to know your test results and keep a list of the medicines you take. Where can you learn more? Go to https://fadia.ComActivity. org and sign in to your Playchemy account. Enter F402 in the [facility] box to learn more about \"Learning About Glucose Testing During Pregnancy. \"     If you do not have an account, please click on the \"Sign Up Now\" link. Current as of: July 25, 2018  Content Version: 12.0  © 0708-0424 Healthwise, [facility].  Care

## 2019-08-05 ENCOUNTER — ROUTINE PRENATAL (OUTPATIENT)
Dept: OBGYN CLINIC | Age: 19
End: 2019-08-05

## 2019-08-05 VITALS
HEART RATE: 91 BPM | SYSTOLIC BLOOD PRESSURE: 117 MMHG | DIASTOLIC BLOOD PRESSURE: 78 MMHG | BODY MASS INDEX: 30.5 KG/M2 | WEIGHT: 161.4 LBS

## 2019-08-05 DIAGNOSIS — Z3A.25 25 WEEKS GESTATION OF PREGNANCY: ICD-10-CM

## 2019-08-05 DIAGNOSIS — Z34.02 ENCOUNTER FOR SUPERVISION OF NORMAL FIRST PREGNANCY IN SECOND TRIMESTER: Primary | ICD-10-CM

## 2019-08-05 PROCEDURE — 0502F SUBSEQUENT PRENATAL CARE: CPT | Performed by: SPECIALIST

## 2019-08-05 NOTE — PATIENT INSTRUCTIONS
box to learn more about \"Oral Glucose Tolerance Test During Pregnancy: About This Test.\"     If you do not have an account, please click on the \"Sign Up Now\" link. Current as of: July 25, 2018  Content Version: 12.0  © 2054-1788 [employer], Incorporated. Care instructions adapted under license by [facility] ([facility]). If you have questions about a medical condition or this instruction, always ask your healthcare professional. [place] [address] any warranty or liability for your use of this information. Patient Education        Weeks 22 to 26 of Your Pregnancy: Care Instructions  Your Care Instructions    As you enter your 7th month of pregnancy at week 26, your baby's lungs are growing stronger and getting ready to breathe. You may notice that your baby responds to the sound of your or your partner's voice. You may also notice that your baby does less turning and twisting and more squirming or jerking. Jerking often means that your baby has the hiccups. Hiccups are perfectly normal and are only temporary. You may want to think about attending a childbirth preparation class. This is also a good time to start thinking about whether you want to have pain medicine during labor. Most pregnant women are tested for gestational diabetes between weeks 25 and 28. Gestational diabetes occurs when your blood sugar level gets too high when you're pregnant. The test is important, because you can have gestational diabetes and not know it. But the condition can cause problems for your baby. Follow-up care is a key part of your treatment and safety. Be sure to make and go to all appointments, and call your doctor if you are having problems. It's also a good idea to know your test results and keep a list of the medicines you take. How can you care for yourself at home? Ease discomfort from your baby's kicking  · Change your position. Sometimes this will cause your baby to change position too.   · Take a deep your pregnancy. If you have labor signs at 37 weeks or more  If you have signs of labor at 37 weeks or more, your doctor may tell you to call when your labor becomes more active. Symptoms of active labor include:  · Contractions that are regular. · Contractions that are less than 5 minutes apart. · Contractions that are hard to talk through. Follow-up care is a key part of your treatment and safety. Be sure to make and go to all appointments, and call your doctor if you are having problems. It's also a good idea to know your test results and keep a list of the medicines you take. Where can you learn more? Go to https://chpeprinceeweb.[employer]. org and sign in to your [employer] account. Enter  in the OneClass box to learn more about \"Learning About When to Call Your Doctor During Pregnancy (After 20 Weeks). \"     If you do not have an account, please click on the \"Sign Up Now\" link. Current as of: September 5, 2018  Content Version: 12.0  © 0814-1855 [employer]. Care instructions adapted under license by [facility] ([facility]). If you have questions about a medical condition or this instruction, always ask your healthcare professional. [place] [address] any warranty or liability for your use of this information. Patient Education        Vaginal Bleeding During Pregnancy: Care Instructions  Your Care Instructions    Many women have some vaginal bleeding when they are pregnant. In some cases, the bleeding is not serious. And there aren't any more problems with the pregnancy. But sometimes bleeding is a sign of a more serious problem. This is more common if the bleeding is heavy or painful. Examples of more serious problems include miscarriage, an ectopic pregnancy, or a problem with the placenta. You may have to see your doctor again to be sure everything is okay. You may also need more tests to find the cause of the bleeding.   For some women, home treatment is

## 2019-08-15 ENCOUNTER — ROUTINE PRENATAL (OUTPATIENT)
Dept: OBGYN CLINIC | Age: 19
End: 2019-08-15

## 2019-08-15 VITALS
DIASTOLIC BLOOD PRESSURE: 72 MMHG | HEART RATE: 91 BPM | SYSTOLIC BLOOD PRESSURE: 114 MMHG | BODY MASS INDEX: 31.1 KG/M2 | WEIGHT: 164.6 LBS

## 2019-08-15 DIAGNOSIS — Z3A.27 27 WEEKS GESTATION OF PREGNANCY: ICD-10-CM

## 2019-08-15 DIAGNOSIS — Z34.02 ENCOUNTER FOR SUPERVISION OF NORMAL FIRST PREGNANCY IN SECOND TRIMESTER: Primary | ICD-10-CM

## 2019-08-15 PROCEDURE — 0502F SUBSEQUENT PRENATAL CARE: CPT | Performed by: SPECIALIST

## 2019-08-22 ENCOUNTER — HOSPITAL ENCOUNTER (OUTPATIENT)
Age: 19
Discharge: HOME | End: 2019-08-22
Payer: COMMERCIAL

## 2019-08-22 DIAGNOSIS — Z34.02 ENCOUNTER FOR SUPERVISION OF NORMAL FIRST PREGNANCY IN SECOND TRIMESTER: ICD-10-CM

## 2019-08-22 LAB
ABSOLUTE EOS #: 0.1 K/UL (ref 0–0.4)
ABSOLUTE IMMATURE GRANULOCYTE: ABNORMAL K/UL (ref 0–0.3)
ABSOLUTE LYMPH #: 1.8 K/UL (ref 1.2–5.2)
ABSOLUTE MONO #: 0.6 K/UL (ref 0.1–1.3)
BASOPHILS # BLD: 0 % (ref 0–2)
BASOPHILS ABSOLUTE: 0 K/UL (ref 0–0.2)
DIFFERENTIAL TYPE: ABNORMAL
EOSINOPHILS RELATIVE PERCENT: 1 % (ref 0–4)
GLUCOSE ADMINISTRATION: ABNORMAL
GLUCOSE TOLERANCE SCREEN 50G: 142 MG/DL (ref 70–135)
HCT VFR BLD CALC: 32.1 % (ref 36–46)
HEMOGLOBIN: 10.8 G/DL (ref 12–16)
IMMATURE GRANULOCYTES: ABNORMAL %
LYMPHOCYTES # BLD: 16 % (ref 25–45)
MCH RBC QN AUTO: 29.7 PG (ref 26–34)
MCHC RBC AUTO-ENTMCNC: 33.6 G/DL (ref 31–37)
MCV RBC AUTO: 88.2 FL (ref 80–100)
MONOCYTES # BLD: 6 % (ref 2–8)
NRBC AUTOMATED: ABNORMAL PER 100 WBC
PDW BLD-RTO: 13.2 % (ref 11.5–14.9)
PLATELET # BLD: 262 K/UL (ref 150–450)
PLATELET ESTIMATE: ABNORMAL
PMV BLD AUTO: 8.1 FL (ref 6–12)
RBC # BLD: 3.64 M/UL (ref 4–5.2)
RBC # BLD: ABNORMAL 10*6/UL
SEG NEUTROPHILS: 77 % (ref 34–64)
SEGMENTED NEUTROPHILS ABSOLUTE COUNT: 8.8 K/UL (ref 1.3–9.1)
WBC # BLD: 11.3 K/UL (ref 4.5–13.5)
WBC # BLD: ABNORMAL 10*3/UL

## 2019-08-22 PROCEDURE — 86901 BLOOD TYPING SEROLOGIC RH(D): CPT

## 2019-08-22 PROCEDURE — 85025 COMPLETE CBC W/AUTO DIFF WBC: CPT

## 2019-08-22 PROCEDURE — 86900 BLOOD TYPING SEROLOGIC ABO: CPT

## 2019-08-22 PROCEDURE — 36415 COLL VENOUS BLD VENIPUNCTURE: CPT

## 2019-08-22 PROCEDURE — 96372 THER/PROPH/DIAG INJ SC/IM: CPT

## 2019-08-22 PROCEDURE — 6360000002 HC RX W HCPCS: Performed by: SPECIALIST

## 2019-08-22 PROCEDURE — 86850 RBC ANTIBODY SCREEN: CPT

## 2019-08-22 PROCEDURE — 82950 GLUCOSE TEST: CPT

## 2019-08-22 RX ADMIN — HUMAN RHO(D) IMMUNE GLOBULIN 300 MCG: 1500 SOLUTION INTRAMUSCULAR; INTRAVENOUS at 13:55

## 2019-08-23 DIAGNOSIS — D64.9 ANEMIA, UNSPECIFIED TYPE: Primary | ICD-10-CM

## 2019-08-23 DIAGNOSIS — R73.09 ELEVATED GLUCOSE TOLERANCE TEST: Primary | ICD-10-CM

## 2019-08-23 LAB
ABO/RH: NORMAL
ANTIBODY SCREEN: POSITIVE
BLD PROD TYP BPU: NORMAL
BLD PROD TYP BPU: NORMAL
DISPENSE STATUS BLOOD BANK: NORMAL
HISTORY CHECK: NORMAL
Lab: 1
TRANSFUSION STATUS: NORMAL
UNIT DIVISION: 0
UNIT NUMBER: NORMAL

## 2019-08-23 RX ORDER — FERROUS SULFATE 325(65) MG
325 TABLET ORAL 2 TIMES DAILY
Qty: 60 TABLET | Refills: 3 | Status: ON HOLD | OUTPATIENT
Start: 2019-08-23 | End: 2019-11-15 | Stop reason: HOSPADM

## 2019-08-28 ENCOUNTER — HOSPITAL ENCOUNTER (OUTPATIENT)
Age: 19
Setting detail: SPECIMEN
Discharge: HOME | End: 2019-08-28
Payer: COMMERCIAL

## 2019-08-28 DIAGNOSIS — R73.09 ELEVATED GLUCOSE TOLERANCE TEST: ICD-10-CM

## 2019-08-28 LAB
3 HR GLUCOSE: 105 MG/DL (ref 65–139)
AMOUNT GLUCOSE GIVEN: 100 G
GLUCOSE FASTING: 91 MG/DL (ref 65–94)
GLUCOSE TOLERANCE TEST 1 HOUR: 143 MG/DL (ref 65–179)
GLUCOSE TOLERANCE TEST 2 HOUR: 116 MG/DL (ref 65–154)

## 2019-08-28 PROCEDURE — 82951 GLUCOSE TOLERANCE TEST (GTT): CPT

## 2019-08-28 PROCEDURE — 82952 GTT-ADDED SAMPLES: CPT

## 2019-08-28 PROCEDURE — 36415 COLL VENOUS BLD VENIPUNCTURE: CPT

## 2019-08-30 ENCOUNTER — ROUTINE PRENATAL (OUTPATIENT)
Dept: OBGYN CLINIC | Age: 19
End: 2019-08-30
Payer: COMMERCIAL

## 2019-08-30 VITALS
DIASTOLIC BLOOD PRESSURE: 79 MMHG | BODY MASS INDEX: 30.8 KG/M2 | HEART RATE: 91 BPM | WEIGHT: 163 LBS | SYSTOLIC BLOOD PRESSURE: 121 MMHG

## 2019-08-30 DIAGNOSIS — D50.8 IRON DEFICIENCY ANEMIA SECONDARY TO INADEQUATE DIETARY IRON INTAKE: Primary | ICD-10-CM

## 2019-08-30 DIAGNOSIS — Z34.03 ENCOUNTER FOR SUPERVISION OF NORMAL FIRST PREGNANCY IN THIRD TRIMESTER: ICD-10-CM

## 2019-08-30 DIAGNOSIS — Z3A.29 29 WEEKS GESTATION OF PREGNANCY: ICD-10-CM

## 2019-08-30 PROCEDURE — 99213 OFFICE O/P EST LOW 20 MIN: CPT | Performed by: SPECIALIST

## 2019-09-03 NOTE — PROGRESS NOTES
Patient states she had 3 hour testing completed, however, results are not available at this time. Fetal heart rate ausculted at 140 bpm and fundal height is 28 cm. today. Patient advised to continue taking prenatal vitamins and to rest as necessary. The patient will return to the office for her next visit in 2 weeks. See antepartum flow sheet. Reyes Rivas am scribing for, and in the presence of Dr. Bert Razo. Electronically signed by: Eliazar Kat 9/3/19 9:32 AM   I agree to the above documentation placed by my scribe Eliazar Kat. I reviewed the scribe's note and agree with the documented findings and plan of care. Any areas of disagreement are noted on the chart. I have personally evaluated this patient. Additional findings are as noted. I agree with the chief complaint, past medical history, past surgical history, allergies, medications, social and family history as documented unless otherwise noted below.      Electronically signed by Bert Razo MD on 9/4/2019 at 4:41 PM

## 2019-09-12 ENCOUNTER — ROUTINE PRENATAL (OUTPATIENT)
Dept: OBGYN CLINIC | Age: 19
End: 2019-09-12

## 2019-09-12 VITALS
BODY MASS INDEX: 31.06 KG/M2 | HEART RATE: 92 BPM | DIASTOLIC BLOOD PRESSURE: 77 MMHG | WEIGHT: 164.4 LBS | SYSTOLIC BLOOD PRESSURE: 123 MMHG

## 2019-09-12 DIAGNOSIS — Z3A.31 31 WEEKS GESTATION OF PREGNANCY: ICD-10-CM

## 2019-09-12 DIAGNOSIS — Z34.03 ENCOUNTER FOR SUPERVISION OF NORMAL FIRST PREGNANCY IN THIRD TRIMESTER: Primary | ICD-10-CM

## 2019-09-12 PROCEDURE — 0502F SUBSEQUENT PRENATAL CARE: CPT | Performed by: CLINICAL NURSE SPECIALIST

## 2019-09-27 ENCOUNTER — ROUTINE PRENATAL (OUTPATIENT)
Dept: OBGYN CLINIC | Age: 19
End: 2019-09-27

## 2019-09-27 VITALS
WEIGHT: 166 LBS | BODY MASS INDEX: 31.37 KG/M2 | DIASTOLIC BLOOD PRESSURE: 76 MMHG | SYSTOLIC BLOOD PRESSURE: 130 MMHG | HEART RATE: 79 BPM

## 2019-09-27 DIAGNOSIS — D50.8 IRON DEFICIENCY ANEMIA SECONDARY TO INADEQUATE DIETARY IRON INTAKE: ICD-10-CM

## 2019-09-27 DIAGNOSIS — Z34.03 ENCOUNTER FOR SUPERVISION OF NORMAL FIRST PREGNANCY IN THIRD TRIMESTER: Primary | ICD-10-CM

## 2019-09-27 DIAGNOSIS — Z3A.33 33 WEEKS GESTATION OF PREGNANCY: ICD-10-CM

## 2019-09-27 PROCEDURE — 0502F SUBSEQUENT PRENATAL CARE: CPT | Performed by: SPECIALIST

## 2019-09-27 RX ORDER — FAMOTIDINE 20 MG/1
20 TABLET, FILM COATED ORAL 2 TIMES DAILY
Qty: 60 TABLET | Refills: 3 | Status: SHIPPED | OUTPATIENT
Start: 2019-09-27 | End: 2019-11-19 | Stop reason: SDUPTHER

## 2019-09-27 NOTE — PROGRESS NOTES
Mike Summers is a 20 y.o. female 30w4d        OB History    Para Term  AB Living   1             SAB TAB Ectopic Molar Multiple Live Births                    # Outcome Date GA Lbr Wayne/2nd Weight Sex Delivery Anes PTL Lv   1 Current                Chief Complaint   Patient presents with    Routine Prenatal Visit     Patient is here today for a routine prenatal visit. She c/o frequent heartburn and would like a script sent to her pharmacy because tums and rolaids are not working. Blood pressure 130/76, pulse 79, weight 166 lb (75.3 kg), last menstrual period 2019, not currently breastfeeding. The patient was seen and evaluated. There was positive fetal movements. No contractions or leakage of fluid. Signs and symptoms of  labor as well as labor were reviewed. The S/S of Pre-Eclampsia were reviewed with the patient in detail. She is to report any of these if they occur. She currently denies any of these. The patient had her 28 week labs completed. The patient was instructed on fetal kick counts and was given a kick sheet to complete every 8 hours. She was instructed that the baby should move at a minimum of ten times within one hour after a meal. The patient was instructed to lay down on her left side twenty minutes after eating and count movements for up to one hour with a target value of ten movements. She was instructed to notify the office if she did not make that target after two attempts or if after any attempt there was less than four movements. The patient reports that the targets have been made Yes.     Patient Active Problem List    Diagnosis Date Noted    Iron deficiency anemia secondary to inadequate dietary iron intake 2019    Bipolar 1 disorder ([facility] [address] 10/08/2018    PCOS (polycystic ovarian syndrome) 2017    Deliberate self-cutting 2017    Need for Menactra vaccination 2016     Orders Placed This Encounter   Medications   

## 2019-10-11 ENCOUNTER — ROUTINE PRENATAL (OUTPATIENT)
Dept: OBGYN CLINIC | Age: 19
End: 2019-10-11
Payer: COMMERCIAL

## 2019-10-11 VITALS
HEART RATE: 87 BPM | BODY MASS INDEX: 31.67 KG/M2 | SYSTOLIC BLOOD PRESSURE: 116 MMHG | DIASTOLIC BLOOD PRESSURE: 76 MMHG | WEIGHT: 167.6 LBS

## 2019-10-11 DIAGNOSIS — Z3A.35 35 WEEKS GESTATION OF PREGNANCY: ICD-10-CM

## 2019-10-11 DIAGNOSIS — Z34.03 ENCOUNTER FOR SUPERVISION OF NORMAL FIRST PREGNANCY IN THIRD TRIMESTER: Primary | ICD-10-CM

## 2019-10-11 PROCEDURE — G8417 CALC BMI ABV UP PARAM F/U: HCPCS | Performed by: SPECIALIST

## 2019-10-11 PROCEDURE — 90471 IMMUNIZATION ADMIN: CPT | Performed by: SPECIALIST

## 2019-10-11 PROCEDURE — 90688 IIV4 VACCINE SPLT 0.5 ML IM: CPT | Performed by: SPECIALIST

## 2019-10-11 PROCEDURE — 0502F SUBSEQUENT PRENATAL CARE: CPT | Performed by: SPECIALIST

## 2019-10-11 PROCEDURE — G8482 FLU IMMUNIZE ORDER/ADMIN: HCPCS | Performed by: SPECIALIST

## 2019-10-11 PROCEDURE — G8427 DOCREV CUR MEDS BY ELIG CLIN: HCPCS | Performed by: SPECIALIST

## 2019-10-11 PROCEDURE — 4004F PT TOBACCO SCREEN RCVD TLK: CPT | Performed by: SPECIALIST

## 2019-10-11 PROCEDURE — 90715 TDAP VACCINE 7 YRS/> IM: CPT | Performed by: SPECIALIST

## 2019-10-11 PROCEDURE — 90472 IMMUNIZATION ADMIN EACH ADD: CPT | Performed by: SPECIALIST

## 2019-10-18 ENCOUNTER — ROUTINE PRENATAL (OUTPATIENT)
Dept: OBGYN CLINIC | Age: 19
End: 2019-10-18

## 2019-10-18 VITALS
HEART RATE: 104 BPM | SYSTOLIC BLOOD PRESSURE: 136 MMHG | DIASTOLIC BLOOD PRESSURE: 85 MMHG | WEIGHT: 168.6 LBS | BODY MASS INDEX: 31.86 KG/M2

## 2019-10-18 DIAGNOSIS — Z34.83 ENCOUNTER FOR SUPERVISION OF OTHER NORMAL PREGNANCY IN THIRD TRIMESTER: Primary | ICD-10-CM

## 2019-10-18 DIAGNOSIS — Z3A.36 36 WEEKS GESTATION OF PREGNANCY: ICD-10-CM

## 2019-10-18 PROCEDURE — G8482 FLU IMMUNIZE ORDER/ADMIN: HCPCS | Performed by: SPECIALIST

## 2019-10-18 PROCEDURE — 4004F PT TOBACCO SCREEN RCVD TLK: CPT | Performed by: SPECIALIST

## 2019-10-18 PROCEDURE — G8417 CALC BMI ABV UP PARAM F/U: HCPCS | Performed by: SPECIALIST

## 2019-10-18 PROCEDURE — G8427 DOCREV CUR MEDS BY ELIG CLIN: HCPCS | Performed by: SPECIALIST

## 2019-10-18 PROCEDURE — 0502F SUBSEQUENT PRENATAL CARE: CPT | Performed by: SPECIALIST

## 2019-10-25 ENCOUNTER — HOSPITAL ENCOUNTER (OUTPATIENT)
Age: 19
Setting detail: SPECIMEN
Discharge: HOME | End: 2019-10-25
Payer: COMMERCIAL

## 2019-10-25 ENCOUNTER — ROUTINE PRENATAL (OUTPATIENT)
Dept: OBGYN CLINIC | Age: 19
End: 2019-10-25

## 2019-10-25 VITALS
HEART RATE: 79 BPM | DIASTOLIC BLOOD PRESSURE: 78 MMHG | WEIGHT: 169.4 LBS | SYSTOLIC BLOOD PRESSURE: 122 MMHG | BODY MASS INDEX: 32.01 KG/M2

## 2019-10-25 DIAGNOSIS — Z3A.37 37 WEEKS GESTATION OF PREGNANCY: ICD-10-CM

## 2019-10-25 DIAGNOSIS — Z34.83 ENCOUNTER FOR SUPERVISION OF OTHER NORMAL PREGNANCY IN THIRD TRIMESTER: ICD-10-CM

## 2019-10-25 DIAGNOSIS — Z34.83 ENCOUNTER FOR SUPERVISION OF OTHER NORMAL PREGNANCY IN THIRD TRIMESTER: Primary | ICD-10-CM

## 2019-10-25 PROBLEM — F31.9 BIPOLAR 1 DISORDER (HCC): Status: RESOLVED | Noted: 2018-10-08 | Resolved: 2019-10-25

## 2019-10-25 PROBLEM — Z72.89 DELIBERATE SELF-CUTTING: Status: RESOLVED | Noted: 2017-06-22 | Resolved: 2019-10-25

## 2019-10-25 PROBLEM — O09.291: Status: ACTIVE | Noted: 2019-10-25

## 2019-10-25 PROBLEM — E28.2 PCOS (POLYCYSTIC OVARIAN SYNDROME): Status: RESOLVED | Noted: 2017-06-22 | Resolved: 2019-10-25

## 2019-10-25 PROBLEM — D50.8 IRON DEFICIENCY ANEMIA SECONDARY TO INADEQUATE DIETARY IRON INTAKE: Status: RESOLVED | Noted: 2019-08-30 | Resolved: 2019-10-25

## 2019-10-25 PROBLEM — Z34.90 SUPERVISION OF NORMAL PREGNANCY: Status: ACTIVE | Noted: 2019-10-25

## 2019-10-25 PROCEDURE — G8417 CALC BMI ABV UP PARAM F/U: HCPCS | Performed by: SPECIALIST

## 2019-10-25 PROCEDURE — 0502F SUBSEQUENT PRENATAL CARE: CPT | Performed by: SPECIALIST

## 2019-10-25 PROCEDURE — 4004F PT TOBACCO SCREEN RCVD TLK: CPT | Performed by: SPECIALIST

## 2019-10-25 PROCEDURE — G8427 DOCREV CUR MEDS BY ELIG CLIN: HCPCS | Performed by: SPECIALIST

## 2019-10-25 PROCEDURE — G8482 FLU IMMUNIZE ORDER/ADMIN: HCPCS | Performed by: SPECIALIST

## 2019-10-25 RX ORDER — METRONIDAZOLE 500 MG/1
500 TABLET ORAL 2 TIMES DAILY
Qty: 14 TABLET | Refills: 0 | Status: SHIPPED | OUTPATIENT
Start: 2019-10-25 | End: 2019-11-01

## 2019-10-25 RX ORDER — FLUCONAZOLE 100 MG/1
100 TABLET ORAL DAILY
Qty: 7 TABLET | Refills: 0 | Status: SHIPPED | OUTPATIENT
Start: 2019-10-25 | End: 2019-11-01

## 2019-10-26 LAB
DIRECT EXAM: NORMAL
Lab: NORMAL
SPECIMEN DESCRIPTION: NORMAL

## 2019-11-05 ENCOUNTER — ROUTINE PRENATAL (OUTPATIENT)
Dept: OBGYN CLINIC | Age: 19
End: 2019-11-05

## 2019-11-05 VITALS
HEART RATE: 88 BPM | BODY MASS INDEX: 32.61 KG/M2 | DIASTOLIC BLOOD PRESSURE: 75 MMHG | SYSTOLIC BLOOD PRESSURE: 117 MMHG | WEIGHT: 172.6 LBS

## 2019-11-05 DIAGNOSIS — Z3A.38 38 WEEKS GESTATION OF PREGNANCY: ICD-10-CM

## 2019-11-05 DIAGNOSIS — Z34.83 ENCOUNTER FOR SUPERVISION OF OTHER NORMAL PREGNANCY IN THIRD TRIMESTER: Primary | ICD-10-CM

## 2019-11-05 PROBLEM — Z3A.37 37 WEEKS GESTATION OF PREGNANCY: Status: RESOLVED | Noted: 2019-10-25 | Resolved: 2019-11-05

## 2019-11-05 PROBLEM — O09.291: Status: RESOLVED | Noted: 2019-10-25 | Resolved: 2019-11-05

## 2019-11-05 PROCEDURE — G8417 CALC BMI ABV UP PARAM F/U: HCPCS | Performed by: SPECIALIST

## 2019-11-05 PROCEDURE — 0502F SUBSEQUENT PRENATAL CARE: CPT | Performed by: SPECIALIST

## 2019-11-05 PROCEDURE — 4004F PT TOBACCO SCREEN RCVD TLK: CPT | Performed by: SPECIALIST

## 2019-11-05 PROCEDURE — G8482 FLU IMMUNIZE ORDER/ADMIN: HCPCS | Performed by: SPECIALIST

## 2019-11-05 PROCEDURE — G8427 DOCREV CUR MEDS BY ELIG CLIN: HCPCS | Performed by: SPECIALIST

## 2019-11-14 ENCOUNTER — APPOINTMENT (OUTPATIENT)
Dept: LABOR AND DELIVERY | Age: 19
End: 2019-11-14
Payer: COMMERCIAL

## 2019-11-14 ENCOUNTER — HOSPITAL ENCOUNTER (INPATIENT)
Age: 19
LOS: 3 days | Discharge: HOME | End: 2019-11-17
Attending: OBSTETRICS & GYNECOLOGY | Admitting: OBSTETRICS & GYNECOLOGY
Payer: COMMERCIAL

## 2019-11-14 ENCOUNTER — ROUTINE PRENATAL (OUTPATIENT)
Dept: OBGYN CLINIC | Age: 19
End: 2019-11-14

## 2019-11-14 ENCOUNTER — TELEPHONE (OUTPATIENT)
Dept: OBGYN CLINIC | Age: 19
End: 2019-11-14

## 2019-11-14 VITALS
DIASTOLIC BLOOD PRESSURE: 92 MMHG | HEART RATE: 92 BPM | WEIGHT: 173.4 LBS | BODY MASS INDEX: 32.76 KG/M2 | SYSTOLIC BLOOD PRESSURE: 138 MMHG

## 2019-11-14 DIAGNOSIS — Z34.03 ENCOUNTER FOR SUPERVISION OF NORMAL FIRST PREGNANCY IN THIRD TRIMESTER: Primary | ICD-10-CM

## 2019-11-14 DIAGNOSIS — Z3A.40 40 WEEKS GESTATION OF PREGNANCY: ICD-10-CM

## 2019-11-14 PROBLEM — R03.0 ELEVATED BP WITHOUT DIAGNOSIS OF HYPERTENSION: Status: ACTIVE | Noted: 2019-11-14

## 2019-11-14 PROBLEM — D50.8 IRON DEFICIENCY ANEMIA SECONDARY TO INADEQUATE DIETARY IRON INTAKE: Status: ACTIVE | Noted: 2019-08-30

## 2019-11-14 PROBLEM — F12.10 TETRAHYDROCANNABINOL (THC) USE DISORDER, MILD, ABUSE: Status: ACTIVE | Noted: 2019-11-14

## 2019-11-14 PROBLEM — Z3A.38 38 WEEKS GESTATION OF PREGNANCY: Status: RESOLVED | Noted: 2019-11-05 | Resolved: 2019-11-14

## 2019-11-14 PROBLEM — Z72.0 TOBACCO USE: Status: ACTIVE | Noted: 2019-11-14

## 2019-11-14 PROBLEM — O13.3 GESTATIONAL HYPERTENSION, THIRD TRIMESTER: Status: ACTIVE | Noted: 2019-11-14

## 2019-11-14 LAB
-: ABNORMAL
ABO/RH: NORMAL
ABSOLUTE EOS #: 0.1 K/UL (ref 0–0.4)
ABSOLUTE IMMATURE GRANULOCYTE: ABNORMAL K/UL (ref 0–0.3)
ABSOLUTE LYMPH #: 2.2 K/UL (ref 1.2–5.2)
ABSOLUTE MONO #: 1.1 K/UL (ref 0.1–1.3)
ALBUMIN SERPL-MCNC: 3.6 G/DL (ref 3.5–5.2)
ALBUMIN/GLOBULIN RATIO: ABNORMAL (ref 1–2.5)
ALP BLD-CCNC: 321 U/L (ref 35–104)
ALT SERPL-CCNC: 17 U/L (ref 5–33)
AMORPHOUS: ABNORMAL
AMPHETAMINE SCREEN URINE: NEGATIVE
ANION GAP SERPL CALCULATED.3IONS-SCNC: 14 MMOL/L (ref 9–17)
ANTIBODY SCREEN: NEGATIVE
ARM BAND NUMBER: NORMAL
AST SERPL-CCNC: 22 U/L
BACTERIA: ABNORMAL
BARBITURATE SCREEN URINE: NEGATIVE
BASOPHILS # BLD: 0 % (ref 0–2)
BASOPHILS ABSOLUTE: 0 K/UL (ref 0–0.2)
BENZODIAZEPINE SCREEN, URINE: NEGATIVE
BILIRUB SERPL-MCNC: 0.23 MG/DL (ref 0.3–1.2)
BILIRUBIN URINE: NEGATIVE
BUN BLDV-MCNC: 7 MG/DL (ref 6–20)
BUN/CREAT BLD: ABNORMAL (ref 9–20)
BUPRENORPHINE URINE: NORMAL
CALCIUM SERPL-MCNC: 9.6 MG/DL (ref 8.6–10.4)
CANNABINOID SCREEN URINE: NEGATIVE
CASTS UA: ABNORMAL /LPF
CHLORIDE BLD-SCNC: 100 MMOL/L (ref 98–107)
CO2: 18 MMOL/L (ref 20–31)
COCAINE METABOLITE, URINE: NEGATIVE
COLOR: YELLOW
COMMENT UA: ABNORMAL
CREAT SERPL-MCNC: 0.48 MG/DL (ref 0.5–0.9)
CREATININE URINE: 44.4 MG/DL (ref 28–217)
CRYSTALS, UA: ABNORMAL /HPF
DIFFERENTIAL TYPE: ABNORMAL
EOSINOPHILS RELATIVE PERCENT: 1 % (ref 0–4)
EPITHELIAL CELLS UA: ABNORMAL /HPF
EXPIRATION DATE: NORMAL
FIBRINOGEN: 614 MG/DL (ref 210–530)
GFR AFRICAN AMERICAN: >60 ML/MIN
GFR NON-AFRICAN AMERICAN: >60 ML/MIN
GFR SERPL CREATININE-BSD FRML MDRD: ABNORMAL ML/MIN/{1.73_M2}
GFR SERPL CREATININE-BSD FRML MDRD: ABNORMAL ML/MIN/{1.73_M2}
GLUCOSE BLD-MCNC: 86 MG/DL (ref 70–99)
GLUCOSE URINE: NEGATIVE
HCT VFR BLD CALC: 39.3 % (ref 36–46)
HEMOGLOBIN: 13.4 G/DL (ref 12–16)
IMMATURE GRANULOCYTES: ABNORMAL %
INR BLD: 0.9
KETONES, URINE: NEGATIVE
LEUKOCYTE ESTERASE, URINE: ABNORMAL
LYMPHOCYTES # BLD: 18 % (ref 25–45)
MCH RBC QN AUTO: 30 PG (ref 26–34)
MCHC RBC AUTO-ENTMCNC: 33.9 G/DL (ref 31–37)
MCV RBC AUTO: 88.6 FL (ref 80–100)
MDMA URINE: NORMAL
METHADONE SCREEN, URINE: NEGATIVE
METHAMPHETAMINE, URINE: NORMAL
MONOCYTES # BLD: 9 % (ref 2–8)
MUCUS: ABNORMAL
NITRITE, URINE: NEGATIVE
NRBC AUTOMATED: ABNORMAL PER 100 WBC
OPIATES, URINE: NEGATIVE
OTHER OBSERVATIONS UA: ABNORMAL
OXYCODONE SCREEN URINE: NEGATIVE
PARTIAL THROMBOPLASTIN TIME: 27.6 SEC (ref 24–36)
PDW BLD-RTO: 14.1 % (ref 11.5–14.9)
PH UA: 6.5 (ref 5–8)
PHENCYCLIDINE, URINE: NEGATIVE
PLATELET # BLD: 238 K/UL (ref 150–450)
PLATELET ESTIMATE: ABNORMAL
PMV BLD AUTO: 9.1 FL (ref 6–12)
POTASSIUM SERPL-SCNC: 3.9 MMOL/L (ref 3.7–5.3)
PROPOXYPHENE, URINE: NORMAL
PROTEIN UA: NEGATIVE
PROTHROMBIN TIME: 12.2 SEC (ref 11.8–14.6)
RBC # BLD: 4.44 M/UL (ref 4–5.2)
RBC # BLD: ABNORMAL 10*6/UL
RBC UA: ABNORMAL /HPF
RENAL EPITHELIAL, UA: ABNORMAL /HPF
SEG NEUTROPHILS: 72 % (ref 34–64)
SEGMENTED NEUTROPHILS ABSOLUTE COUNT: 9 K/UL (ref 1.3–9.1)
SODIUM BLD-SCNC: 132 MMOL/L (ref 135–144)
SPECIFIC GRAVITY UA: 1.01 (ref 1–1.03)
TEST INFORMATION: NORMAL
TOTAL PROTEIN, URINE: 10 MG/DL
TOTAL PROTEIN: 7.6 G/DL (ref 6.4–8.3)
TRICHOMONAS: ABNORMAL
TRICYCLIC ANTIDEPRESSANTS, UR: NORMAL
TURBIDITY: ABNORMAL
URINE HGB: ABNORMAL
URINE TOTAL PROTEIN CREATININE RATIO: 0.23 (ref 0–0.2)
UROBILINOGEN, URINE: NORMAL
WBC # BLD: 12.4 K/UL (ref 4.5–13.5)
WBC # BLD: ABNORMAL 10*3/UL
WBC UA: ABNORMAL /HPF
YEAST: ABNORMAL

## 2019-11-14 PROCEDURE — G8482 FLU IMMUNIZE ORDER/ADMIN: HCPCS | Performed by: CLINICAL NURSE SPECIALIST

## 2019-11-14 PROCEDURE — 86850 RBC ANTIBODY SCREEN: CPT

## 2019-11-14 PROCEDURE — 0502F SUBSEQUENT PRENATAL CARE: CPT | Performed by: CLINICAL NURSE SPECIALIST

## 2019-11-14 PROCEDURE — G8417 CALC BMI ABV UP PARAM F/U: HCPCS | Performed by: CLINICAL NURSE SPECIALIST

## 2019-11-14 PROCEDURE — 36415 COLL VENOUS BLD VENIPUNCTURE: CPT

## 2019-11-14 PROCEDURE — 84156 ASSAY OF PROTEIN URINE: CPT

## 2019-11-14 PROCEDURE — 85025 COMPLETE CBC W/AUTO DIFF WBC: CPT

## 2019-11-14 PROCEDURE — 80307 DRUG TEST PRSMV CHEM ANLYZR: CPT

## 2019-11-14 PROCEDURE — 82570 ASSAY OF URINE CREATININE: CPT

## 2019-11-14 PROCEDURE — 86901 BLOOD TYPING SEROLOGIC RH(D): CPT

## 2019-11-14 PROCEDURE — 86780 TREPONEMA PALLIDUM: CPT

## 2019-11-14 PROCEDURE — 85610 PROTHROMBIN TIME: CPT

## 2019-11-14 PROCEDURE — 59200 INSERT CERVICAL DILATOR: CPT

## 2019-11-14 PROCEDURE — 80053 COMPREHEN METABOLIC PANEL: CPT

## 2019-11-14 PROCEDURE — 1220000000 HC SEMI PRIVATE OB R&B

## 2019-11-14 PROCEDURE — 2580000003 HC RX 258: Performed by: STUDENT IN AN ORGANIZED HEALTH CARE EDUCATION/TRAINING PROGRAM

## 2019-11-14 PROCEDURE — 85730 THROMBOPLASTIN TIME PARTIAL: CPT

## 2019-11-14 PROCEDURE — 6360000002 HC RX W HCPCS: Performed by: STUDENT IN AN ORGANIZED HEALTH CARE EDUCATION/TRAINING PROGRAM

## 2019-11-14 PROCEDURE — 76815 OB US LIMITED FETUS(S): CPT

## 2019-11-14 PROCEDURE — 6370000000 HC RX 637 (ALT 250 FOR IP): Performed by: STUDENT IN AN ORGANIZED HEALTH CARE EDUCATION/TRAINING PROGRAM

## 2019-11-14 PROCEDURE — 85384 FIBRINOGEN ACTIVITY: CPT

## 2019-11-14 PROCEDURE — 4004F PT TOBACCO SCREEN RCVD TLK: CPT | Performed by: CLINICAL NURSE SPECIALIST

## 2019-11-14 PROCEDURE — 87086 URINE CULTURE/COLONY COUNT: CPT

## 2019-11-14 PROCEDURE — 81001 URINALYSIS AUTO W/SCOPE: CPT

## 2019-11-14 PROCEDURE — 86900 BLOOD TYPING SEROLOGIC ABO: CPT

## 2019-11-14 PROCEDURE — G8427 DOCREV CUR MEDS BY ELIG CLIN: HCPCS | Performed by: CLINICAL NURSE SPECIALIST

## 2019-11-14 RX ORDER — DIPHENHYDRAMINE HCL 25 MG
25 TABLET ORAL EVERY 4 HOURS PRN
Status: DISCONTINUED | OUTPATIENT
Start: 2019-11-14 | End: 2019-11-15

## 2019-11-14 RX ORDER — SODIUM CHLORIDE 0.9 % (FLUSH) 0.9 %
10 SYRINGE (ML) INJECTION PRN
Status: DISCONTINUED | OUTPATIENT
Start: 2019-11-14 | End: 2019-11-15

## 2019-11-14 RX ORDER — SODIUM CHLORIDE 0.9 % (FLUSH) 0.9 %
10 SYRINGE (ML) INJECTION EVERY 12 HOURS SCHEDULED
Status: DISCONTINUED | OUTPATIENT
Start: 2019-11-14 | End: 2019-11-15

## 2019-11-14 RX ORDER — MORPHINE SULFATE 10 MG/ML
10 INJECTION, SOLUTION INTRAMUSCULAR; INTRAVENOUS ONCE
Status: COMPLETED | OUTPATIENT
Start: 2019-11-14 | End: 2019-11-14

## 2019-11-14 RX ORDER — PROMETHAZINE HYDROCHLORIDE 25 MG/ML
25 INJECTION, SOLUTION INTRAMUSCULAR; INTRAVENOUS ONCE
Status: COMPLETED | OUTPATIENT
Start: 2019-11-14 | End: 2019-11-14

## 2019-11-14 RX ORDER — SODIUM CHLORIDE, SODIUM LACTATE, POTASSIUM CHLORIDE, CALCIUM CHLORIDE 600; 310; 30; 20 MG/100ML; MG/100ML; MG/100ML; MG/100ML
INJECTION, SOLUTION INTRAVENOUS CONTINUOUS
Status: DISCONTINUED | OUTPATIENT
Start: 2019-11-14 | End: 2019-11-15

## 2019-11-14 RX ORDER — ACETAMINOPHEN 500 MG
1000 TABLET ORAL EVERY 6 HOURS PRN
Status: DISCONTINUED | OUTPATIENT
Start: 2019-11-14 | End: 2019-11-15

## 2019-11-14 RX ORDER — SEVOFLURANE 250 ML/250ML
1 LIQUID RESPIRATORY (INHALATION) CONTINUOUS PRN
Status: DISCONTINUED | OUTPATIENT
Start: 2019-11-14 | End: 2019-11-15

## 2019-11-14 RX ADMIN — SODIUM CHLORIDE, POTASSIUM CHLORIDE, SODIUM LACTATE AND CALCIUM CHLORIDE: 600; 310; 30; 20 INJECTION, SOLUTION INTRAVENOUS at 22:17

## 2019-11-14 RX ADMIN — ACETAMINOPHEN 1000 MG: 500 TABLET, FILM COATED ORAL at 19:43

## 2019-11-14 RX ADMIN — DIPHENHYDRAMINE HCL 25 MG: 25 TABLET ORAL at 19:43

## 2019-11-14 RX ADMIN — SODIUM CHLORIDE, POTASSIUM CHLORIDE, SODIUM LACTATE AND CALCIUM CHLORIDE: 600; 310; 30; 20 INJECTION, SOLUTION INTRAVENOUS at 11:30

## 2019-11-14 RX ADMIN — DINOPROSTONE 10 MG: 10 INSERT VAGINAL at 14:32

## 2019-11-14 RX ADMIN — MORPHINE SULFATE 10 MG: 10 INJECTION, SOLUTION INTRAMUSCULAR; INTRAVENOUS at 22:07

## 2019-11-14 RX ADMIN — PROMETHAZINE HYDROCHLORIDE 25 MG: 25 INJECTION INTRAMUSCULAR; INTRAVENOUS at 22:08

## 2019-11-14 ASSESSMENT — PAIN SCALES - GENERAL
PAINLEVEL_OUTOF10: 6
PAINLEVEL_OUTOF10: 2
PAINLEVEL_OUTOF10: 7

## 2019-11-14 ASSESSMENT — PAIN DESCRIPTION - DESCRIPTORS
DESCRIPTORS: CRAMPING
DESCRIPTORS: CRAMPING
DESCRIPTORS: CRAMPING;TIGHTNESS
DESCRIPTORS: CRAMPING

## 2019-11-14 ASSESSMENT — PAIN DESCRIPTION - PROGRESSION: CLINICAL_PROGRESSION: NOT CHANGED

## 2019-11-15 ENCOUNTER — ANESTHESIA EVENT (OUTPATIENT)
Dept: LABOR AND DELIVERY | Age: 19
End: 2019-11-15
Payer: COMMERCIAL

## 2019-11-15 ENCOUNTER — ANESTHESIA (OUTPATIENT)
Dept: LABOR AND DELIVERY | Age: 19
End: 2019-11-15
Payer: COMMERCIAL

## 2019-11-15 LAB
CULTURE: NORMAL
HCT VFR BLD CALC: 38.8 % (ref 36–46)
HEMOGLOBIN: 13.2 G/DL (ref 12–16)
Lab: NORMAL
SPECIMEN DESCRIPTION: NORMAL
T. PALLIDUM, IGG: NONREACTIVE

## 2019-11-15 PROCEDURE — 0HQ9XZZ REPAIR PERINEUM SKIN, EXTERNAL APPROACH: ICD-10-PCS | Performed by: SPECIALIST

## 2019-11-15 PROCEDURE — 6370000000 HC RX 637 (ALT 250 FOR IP): Performed by: STUDENT IN AN ORGANIZED HEALTH CARE EDUCATION/TRAINING PROGRAM

## 2019-11-15 PROCEDURE — 85018 HEMOGLOBIN: CPT

## 2019-11-15 PROCEDURE — 85014 HEMATOCRIT: CPT

## 2019-11-15 PROCEDURE — 36415 COLL VENOUS BLD VENIPUNCTURE: CPT

## 2019-11-15 PROCEDURE — 1220000000 HC SEMI PRIVATE OB R&B

## 2019-11-15 PROCEDURE — 88307 TISSUE EXAM BY PATHOLOGIST: CPT

## 2019-11-15 PROCEDURE — 6360000002 HC RX W HCPCS: Performed by: STUDENT IN AN ORGANIZED HEALTH CARE EDUCATION/TRAINING PROGRAM

## 2019-11-15 PROCEDURE — 2500000003 HC RX 250 WO HCPCS: Performed by: ANESTHESIOLOGY

## 2019-11-15 PROCEDURE — 3E0P7VZ INTRODUCTION OF HORMONE INTO FEMALE REPRODUCTIVE, VIA NATURAL OR ARTIFICIAL OPENING: ICD-10-PCS | Performed by: SPECIALIST

## 2019-11-15 PROCEDURE — 10907ZC DRAINAGE OF AMNIOTIC FLUID, THERAPEUTIC FROM PRODUCTS OF CONCEPTION, VIA NATURAL OR ARTIFICIAL OPENING: ICD-10-PCS | Performed by: SPECIALIST

## 2019-11-15 PROCEDURE — 7200000001 HC VAGINAL DELIVERY

## 2019-11-15 PROCEDURE — 3700000025 EPIDURAL BLOCK: Performed by: ANESTHESIOLOGY

## 2019-11-15 PROCEDURE — 2580000003 HC RX 258: Performed by: STUDENT IN AN ORGANIZED HEALTH CARE EDUCATION/TRAINING PROGRAM

## 2019-11-15 RX ORDER — EPHEDRINE SULFATE 50 MG/ML
10 INJECTION INTRAVENOUS EVERY 10 MIN PRN
Status: DISCONTINUED | OUTPATIENT
Start: 2019-11-15 | End: 2019-11-15

## 2019-11-15 RX ORDER — NALOXONE HYDROCHLORIDE 0.4 MG/ML
0.4 INJECTION, SOLUTION INTRAMUSCULAR; INTRAVENOUS; SUBCUTANEOUS PRN
Status: DISCONTINUED | OUTPATIENT
Start: 2019-11-15 | End: 2019-11-15

## 2019-11-15 RX ORDER — LANOLIN 100 %
OINTMENT (GRAM) TOPICAL PRN
Status: DISCONTINUED | OUTPATIENT
Start: 2019-11-15 | End: 2019-11-17 | Stop reason: HOSPADM

## 2019-11-15 RX ORDER — IBUPROFEN 800 MG/1
800 TABLET ORAL EVERY 8 HOURS PRN
Status: DISCONTINUED | OUTPATIENT
Start: 2019-11-15 | End: 2019-11-17 | Stop reason: HOSPADM

## 2019-11-15 RX ORDER — ONDANSETRON 2 MG/ML
4 INJECTION INTRAMUSCULAR; INTRAVENOUS EVERY 6 HOURS PRN
Status: DISCONTINUED | OUTPATIENT
Start: 2019-11-15 | End: 2019-11-15

## 2019-11-15 RX ORDER — NALBUPHINE HCL 10 MG/ML
10 AMPUL (ML) INJECTION ONCE
Status: COMPLETED | OUTPATIENT
Start: 2019-11-15 | End: 2019-11-15

## 2019-11-15 RX ORDER — DOCUSATE SODIUM 100 MG/1
100 CAPSULE, LIQUID FILLED ORAL 2 TIMES DAILY
Qty: 60 CAPSULE | Refills: 0 | Status: SHIPPED | OUTPATIENT
Start: 2019-11-15 | End: 2019-12-26

## 2019-11-15 RX ORDER — SIMETHICONE 80 MG
80 TABLET,CHEWABLE ORAL EVERY 6 HOURS PRN
Status: DISCONTINUED | OUTPATIENT
Start: 2019-11-15 | End: 2019-11-17 | Stop reason: HOSPADM

## 2019-11-15 RX ORDER — DOCUSATE SODIUM 100 MG/1
100 CAPSULE, LIQUID FILLED ORAL 2 TIMES DAILY
Status: DISCONTINUED | OUTPATIENT
Start: 2019-11-15 | End: 2019-11-17 | Stop reason: HOSPADM

## 2019-11-15 RX ORDER — ACETAMINOPHEN 500 MG
1000 TABLET ORAL EVERY 6 HOURS PRN
Status: DISCONTINUED | OUTPATIENT
Start: 2019-11-15 | End: 2019-11-17 | Stop reason: HOSPADM

## 2019-11-15 RX ORDER — IBUPROFEN 800 MG/1
800 TABLET ORAL EVERY 8 HOURS PRN
Qty: 30 TABLET | Refills: 1 | Status: SHIPPED | OUTPATIENT
Start: 2019-11-15 | End: 2022-09-26

## 2019-11-15 RX ORDER — NALBUPHINE HCL 10 MG/ML
5 AMPUL (ML) INJECTION
Status: DISCONTINUED | OUTPATIENT
Start: 2019-11-15 | End: 2019-11-15

## 2019-11-15 RX ORDER — LIDOCAINE HYDROCHLORIDE AND EPINEPHRINE 15; 5 MG/ML; UG/ML
INJECTION, SOLUTION EPIDURAL PRN
Status: DISCONTINUED | OUTPATIENT
Start: 2019-11-15 | End: 2019-11-15 | Stop reason: SDUPTHER

## 2019-11-15 RX ORDER — PROMETHAZINE HYDROCHLORIDE 25 MG/ML
12.5 INJECTION, SOLUTION INTRAMUSCULAR; INTRAVENOUS EVERY 4 HOURS PRN
Status: DISCONTINUED | OUTPATIENT
Start: 2019-11-15 | End: 2019-11-17 | Stop reason: HOSPADM

## 2019-11-15 RX ADMIN — SODIUM CHLORIDE, POTASSIUM CHLORIDE, SODIUM LACTATE AND CALCIUM CHLORIDE: 600; 310; 30; 20 INJECTION, SOLUTION INTRAVENOUS at 08:53

## 2019-11-15 RX ADMIN — NALBUPHINE HYDROCHLORIDE 10 MG: 10 INJECTION, SOLUTION INTRAMUSCULAR; INTRAVENOUS; SUBCUTANEOUS at 02:42

## 2019-11-15 RX ADMIN — Medication 50 MILLI-UNITS/MIN: at 13:39

## 2019-11-15 RX ADMIN — LIDOCAINE HYDROCHLORIDE,EPINEPHRINE BITARTRATE 3 ML: 15; .005 INJECTION, SOLUTION EPIDURAL; INFILTRATION; INTRACAUDAL; PERINEURAL at 05:10

## 2019-11-15 RX ADMIN — SODIUM CHLORIDE, POTASSIUM CHLORIDE, SODIUM LACTATE AND CALCIUM CHLORIDE: 600; 310; 30; 20 INJECTION, SOLUTION INTRAVENOUS at 04:19

## 2019-11-15 RX ADMIN — SODIUM CHLORIDE, POTASSIUM CHLORIDE, SODIUM LACTATE AND CALCIUM CHLORIDE: 600; 310; 30; 20 INJECTION, SOLUTION INTRAVENOUS at 05:21

## 2019-11-15 RX ADMIN — IBUPROFEN 800 MG: 800 TABLET, FILM COATED ORAL at 14:42

## 2019-11-15 RX ADMIN — DOCUSATE SODIUM 100 MG: 100 CAPSULE, LIQUID FILLED ORAL at 14:42

## 2019-11-15 RX ADMIN — Medication 6 ML/HR: at 05:16

## 2019-11-15 ASSESSMENT — PAIN SCALES - GENERAL
PAINLEVEL_OUTOF10: 4
PAINLEVEL_OUTOF10: 8

## 2019-11-16 PROCEDURE — 59400 OBSTETRICAL CARE: CPT | Performed by: SPECIALIST

## 2019-11-16 PROCEDURE — 6370000000 HC RX 637 (ALT 250 FOR IP): Performed by: STUDENT IN AN ORGANIZED HEALTH CARE EDUCATION/TRAINING PROGRAM

## 2019-11-16 PROCEDURE — 1220000000 HC SEMI PRIVATE OB R&B

## 2019-11-16 RX ORDER — CALCIUM CARBONATE 200(500)MG
1000 TABLET,CHEWABLE ORAL 2 TIMES DAILY PRN
Status: DISCONTINUED | OUTPATIENT
Start: 2019-11-16 | End: 2019-11-17 | Stop reason: HOSPADM

## 2019-11-16 RX ADMIN — DOCUSATE SODIUM 100 MG: 100 CAPSULE, LIQUID FILLED ORAL at 23:26

## 2019-11-16 RX ADMIN — IBUPROFEN 800 MG: 800 TABLET, FILM COATED ORAL at 23:26

## 2019-11-16 RX ADMIN — ACETAMINOPHEN 1000 MG: 500 TABLET, FILM COATED ORAL at 13:40

## 2019-11-16 RX ADMIN — IBUPROFEN 800 MG: 800 TABLET, FILM COATED ORAL at 13:40

## 2019-11-16 RX ADMIN — DOCUSATE SODIUM 100 MG: 100 CAPSULE, LIQUID FILLED ORAL at 13:39

## 2019-11-16 ASSESSMENT — PAIN SCALES - GENERAL
PAINLEVEL_OUTOF10: 4
PAINLEVEL_OUTOF10: 5

## 2019-11-17 VITALS
RESPIRATION RATE: 16 BRPM | HEIGHT: 61 IN | DIASTOLIC BLOOD PRESSURE: 87 MMHG | OXYGEN SATURATION: 100 % | WEIGHT: 173 LBS | TEMPERATURE: 97.2 F | SYSTOLIC BLOOD PRESSURE: 140 MMHG | HEART RATE: 77 BPM | BODY MASS INDEX: 32.66 KG/M2

## 2019-11-17 PROCEDURE — 6370000000 HC RX 637 (ALT 250 FOR IP): Performed by: STUDENT IN AN ORGANIZED HEALTH CARE EDUCATION/TRAINING PROGRAM

## 2019-11-17 RX ADMIN — IBUPROFEN 800 MG: 800 TABLET, FILM COATED ORAL at 11:09

## 2019-11-17 RX ADMIN — DOCUSATE SODIUM 100 MG: 100 CAPSULE, LIQUID FILLED ORAL at 11:09

## 2019-11-17 ASSESSMENT — PAIN SCALES - GENERAL: PAINLEVEL_OUTOF10: 5

## 2019-11-19 LAB — SURGICAL PATHOLOGY REPORT: NORMAL

## 2019-11-19 RX ORDER — FAMOTIDINE 20 MG/1
20 TABLET, FILM COATED ORAL 2 TIMES DAILY
Qty: 60 TABLET | Refills: 3 | Status: SHIPPED | OUTPATIENT
Start: 2019-11-19 | End: 2019-11-21 | Stop reason: ALTCHOICE

## 2019-11-21 ENCOUNTER — POSTPARTUM VISIT (OUTPATIENT)
Dept: OBGYN CLINIC | Age: 19
End: 2019-11-21

## 2019-11-21 VITALS
SYSTOLIC BLOOD PRESSURE: 128 MMHG | WEIGHT: 160.8 LBS | HEIGHT: 61 IN | HEART RATE: 78 BPM | DIASTOLIC BLOOD PRESSURE: 86 MMHG | BODY MASS INDEX: 30.36 KG/M2 | RESPIRATION RATE: 18 BRPM

## 2019-11-21 PROCEDURE — 99024 POSTOP FOLLOW-UP VISIT: CPT | Performed by: CLINICAL NURSE SPECIALIST

## 2019-11-21 ASSESSMENT — ENCOUNTER SYMPTOMS
ALLERGIC/IMMUNOLOGIC NEGATIVE: 1
RESPIRATORY NEGATIVE: 1
EYES NEGATIVE: 1
GASTROINTESTINAL NEGATIVE: 1

## 2019-11-26 ENCOUNTER — POSTPARTUM VISIT (OUTPATIENT)
Dept: OBGYN CLINIC | Age: 19
End: 2019-11-26
Payer: COMMERCIAL

## 2019-11-26 VITALS
HEART RATE: 98 BPM | HEIGHT: 61 IN | DIASTOLIC BLOOD PRESSURE: 82 MMHG | SYSTOLIC BLOOD PRESSURE: 123 MMHG | WEIGHT: 157 LBS | BODY MASS INDEX: 29.64 KG/M2 | RESPIRATION RATE: 18 BRPM

## 2019-11-26 DIAGNOSIS — Z30.09 BIRTH CONTROL COUNSELING: Primary | ICD-10-CM

## 2019-11-26 PROCEDURE — 1111F DSCHRG MED/CURRENT MED MERGE: CPT | Performed by: SPECIALIST

## 2019-11-26 PROCEDURE — G8482 FLU IMMUNIZE ORDER/ADMIN: HCPCS | Performed by: SPECIALIST

## 2019-11-26 PROCEDURE — G8417 CALC BMI ABV UP PARAM F/U: HCPCS | Performed by: SPECIALIST

## 2019-11-26 PROCEDURE — G8427 DOCREV CUR MEDS BY ELIG CLIN: HCPCS | Performed by: SPECIALIST

## 2019-11-26 PROCEDURE — 99212 OFFICE O/P EST SF 10 MIN: CPT | Performed by: SPECIALIST

## 2019-11-26 PROCEDURE — 4004F PT TOBACCO SCREEN RCVD TLK: CPT | Performed by: SPECIALIST

## 2019-11-26 ASSESSMENT — ENCOUNTER SYMPTOMS
DIARRHEA: 0
VOMITING: 0
ABDOMINAL DISTENTION: 0
NAUSEA: 0
ABDOMINAL PAIN: 0
CONSTIPATION: 0
APNEA: 0
EYE PAIN: 0
COUGH: 0

## 2019-12-22 ENCOUNTER — HOSPITAL ENCOUNTER (EMERGENCY)
Age: 19
Discharge: HOME | End: 2019-12-22
Attending: EMERGENCY MEDICINE
Payer: COMMERCIAL

## 2019-12-22 VITALS
DIASTOLIC BLOOD PRESSURE: 98 MMHG | BODY MASS INDEX: 29.45 KG/M2 | HEART RATE: 57 BPM | TEMPERATURE: 97.6 F | SYSTOLIC BLOOD PRESSURE: 145 MMHG | HEIGHT: 61 IN | OXYGEN SATURATION: 99 % | RESPIRATION RATE: 14 BRPM | WEIGHT: 156 LBS

## 2019-12-22 DIAGNOSIS — R10.11 RIGHT UPPER QUADRANT ABDOMINAL PAIN: Primary | ICD-10-CM

## 2019-12-22 LAB
ABSOLUTE EOS #: 0.3 K/UL (ref 0–0.4)
ABSOLUTE IMMATURE GRANULOCYTE: ABNORMAL K/UL (ref 0–0.3)
ABSOLUTE LYMPH #: 2.5 K/UL (ref 1.2–5.2)
ABSOLUTE MONO #: 0.9 K/UL (ref 0.1–1.3)
ALBUMIN SERPL-MCNC: 3.9 G/DL (ref 3.5–5.2)
ALBUMIN/GLOBULIN RATIO: ABNORMAL (ref 1–2.5)
ALP BLD-CCNC: 97 U/L (ref 35–104)
ALT SERPL-CCNC: 16 U/L (ref 5–33)
ANION GAP SERPL CALCULATED.3IONS-SCNC: 12 MMOL/L (ref 9–17)
AST SERPL-CCNC: 17 U/L
BASOPHILS # BLD: 1 % (ref 0–2)
BASOPHILS ABSOLUTE: 0 K/UL (ref 0–0.2)
BILIRUB SERPL-MCNC: 0.16 MG/DL (ref 0.3–1.2)
BUN BLDV-MCNC: 11 MG/DL (ref 6–20)
BUN/CREAT BLD: ABNORMAL (ref 9–20)
CALCIUM SERPL-MCNC: 9 MG/DL (ref 8.6–10.4)
CHLORIDE BLD-SCNC: 103 MMOL/L (ref 98–107)
CO2: 23 MMOL/L (ref 20–31)
CREAT SERPL-MCNC: 0.65 MG/DL (ref 0.5–0.9)
DIFFERENTIAL TYPE: ABNORMAL
EOSINOPHILS RELATIVE PERCENT: 4 % (ref 0–4)
GFR AFRICAN AMERICAN: >60 ML/MIN
GFR NON-AFRICAN AMERICAN: >60 ML/MIN
GFR SERPL CREATININE-BSD FRML MDRD: ABNORMAL ML/MIN/{1.73_M2}
GFR SERPL CREATININE-BSD FRML MDRD: ABNORMAL ML/MIN/{1.73_M2}
GLUCOSE BLD-MCNC: 103 MG/DL (ref 70–99)
HCG QUALITATIVE: NEGATIVE
HCT VFR BLD CALC: 41.9 % (ref 36–46)
HEMOGLOBIN: 14.3 G/DL (ref 12–16)
IMMATURE GRANULOCYTES: ABNORMAL %
LIPASE: 25 U/L (ref 13–60)
LYMPHOCYTES # BLD: 27 % (ref 25–45)
MCH RBC QN AUTO: 29.3 PG (ref 26–34)
MCHC RBC AUTO-ENTMCNC: 34 G/DL (ref 31–37)
MCV RBC AUTO: 86.2 FL (ref 80–100)
MONOCYTES # BLD: 10 % (ref 2–8)
NRBC AUTOMATED: ABNORMAL PER 100 WBC
PDW BLD-RTO: 13.1 % (ref 11.5–14.9)
PLATELET # BLD: 259 K/UL (ref 150–450)
PLATELET ESTIMATE: ABNORMAL
PMV BLD AUTO: 7.7 FL (ref 6–12)
POTASSIUM SERPL-SCNC: 3.8 MMOL/L (ref 3.7–5.3)
RBC # BLD: 4.86 M/UL (ref 4–5.2)
RBC # BLD: ABNORMAL 10*6/UL
SEG NEUTROPHILS: 58 % (ref 34–64)
SEGMENTED NEUTROPHILS ABSOLUTE COUNT: 5.4 K/UL (ref 1.3–9.1)
SODIUM BLD-SCNC: 138 MMOL/L (ref 135–144)
TOTAL PROTEIN: 7.4 G/DL (ref 6.4–8.3)
WBC # BLD: 9.1 K/UL (ref 4.5–13.5)
WBC # BLD: ABNORMAL 10*3/UL

## 2019-12-22 PROCEDURE — 36415 COLL VENOUS BLD VENIPUNCTURE: CPT

## 2019-12-22 PROCEDURE — 85025 COMPLETE CBC W/AUTO DIFF WBC: CPT

## 2019-12-22 PROCEDURE — 83690 ASSAY OF LIPASE: CPT

## 2019-12-22 PROCEDURE — 2580000003 HC RX 258: Performed by: EMERGENCY MEDICINE

## 2019-12-22 PROCEDURE — 99284 EMERGENCY DEPT VISIT MOD MDM: CPT

## 2019-12-22 PROCEDURE — 80053 COMPREHEN METABOLIC PANEL: CPT

## 2019-12-22 PROCEDURE — 84703 CHORIONIC GONADOTROPIN ASSAY: CPT

## 2019-12-22 RX ORDER — OMEPRAZOLE 20 MG/1
20 CAPSULE, DELAYED RELEASE ORAL
Qty: 30 CAPSULE | Refills: 0 | Status: SHIPPED | OUTPATIENT
Start: 2019-12-22 | End: 2019-12-26

## 2019-12-22 RX ORDER — DICYCLOMINE HYDROCHLORIDE 10 MG/ML
20 INJECTION INTRAMUSCULAR ONCE
Status: DISCONTINUED | OUTPATIENT
Start: 2019-12-22 | End: 2019-12-22 | Stop reason: HOSPADM

## 2019-12-22 RX ORDER — 0.9 % SODIUM CHLORIDE 0.9 %
1000 INTRAVENOUS SOLUTION INTRAVENOUS ONCE
Status: COMPLETED | OUTPATIENT
Start: 2019-12-22 | End: 2019-12-22

## 2019-12-22 RX ORDER — PROMETHAZINE HYDROCHLORIDE 25 MG/1
25 TABLET ORAL EVERY 6 HOURS PRN
Qty: 20 TABLET | Refills: 0 | Status: SHIPPED | OUTPATIENT
Start: 2019-12-22 | End: 2019-12-26

## 2019-12-22 RX ORDER — DICYCLOMINE HYDROCHLORIDE 10 MG/ML
20 INJECTION INTRAMUSCULAR ONCE
Status: DISCONTINUED | OUTPATIENT
Start: 2019-12-22 | End: 2019-12-22

## 2019-12-22 RX ORDER — DICYCLOMINE HYDROCHLORIDE 10 MG/1
10 CAPSULE ORAL EVERY 6 HOURS PRN
Qty: 20 CAPSULE | Refills: 0 | Status: SHIPPED | OUTPATIENT
Start: 2019-12-22 | End: 2019-12-26

## 2019-12-22 RX ORDER — PROMETHAZINE HYDROCHLORIDE 25 MG/ML
12.5 INJECTION, SOLUTION INTRAMUSCULAR; INTRAVENOUS ONCE
Status: DISCONTINUED | OUTPATIENT
Start: 2019-12-22 | End: 2019-12-22 | Stop reason: HOSPADM

## 2019-12-22 RX ADMIN — SODIUM CHLORIDE 1000 ML: 9 INJECTION, SOLUTION INTRAVENOUS at 13:43

## 2019-12-22 ASSESSMENT — ENCOUNTER SYMPTOMS
COUGH: 0
SORE THROAT: 0
VOMITING: 0
SHORTNESS OF BREATH: 0
NAUSEA: 1
ABDOMINAL PAIN: 1
DIARRHEA: 0

## 2019-12-22 ASSESSMENT — PAIN SCALES - GENERAL: PAINLEVEL_OUTOF10: 10

## 2019-12-30 ENCOUNTER — POSTPARTUM VISIT (OUTPATIENT)
Dept: OBGYN CLINIC | Age: 19
End: 2019-12-30

## 2019-12-30 VITALS
SYSTOLIC BLOOD PRESSURE: 128 MMHG | DIASTOLIC BLOOD PRESSURE: 81 MMHG | BODY MASS INDEX: 30.96 KG/M2 | HEART RATE: 77 BPM | WEIGHT: 164 LBS | HEIGHT: 61 IN

## 2019-12-30 PROBLEM — O13.3 GESTATIONAL HYPERTENSION, THIRD TRIMESTER: Status: RESOLVED | Noted: 2019-11-14 | Resolved: 2019-12-30

## 2019-12-30 PROBLEM — Z34.90 SUPERVISION OF NORMAL PREGNANCY: Status: RESOLVED | Noted: 2019-10-25 | Resolved: 2019-12-30

## 2019-12-30 PROBLEM — Z30.09 BIRTH CONTROL COUNSELING: Status: RESOLVED | Noted: 2019-11-26 | Resolved: 2019-12-30

## 2019-12-30 PROCEDURE — 0503F POSTPARTUM CARE VISIT: CPT | Performed by: SPECIALIST

## 2019-12-30 RX ORDER — FLUOXETINE HYDROCHLORIDE 20 MG/1
20 CAPSULE ORAL DAILY
Qty: 30 CAPSULE | Refills: 3 | Status: SHIPPED | OUTPATIENT
Start: 2019-12-30 | End: 2020-05-05 | Stop reason: SDUPTHER

## 2019-12-30 ASSESSMENT — ENCOUNTER SYMPTOMS
DIARRHEA: 0
ABDOMINAL PAIN: 0
EYE PAIN: 0
NAUSEA: 0
COUGH: 0
VOMITING: 0
CONSTIPATION: 0
APNEA: 0
ABDOMINAL DISTENTION: 0

## 2019-12-30 NOTE — PROGRESS NOTES
Postpartum Visit: Patient is here today for postpartum  delivery. I have fully reviewed the prenatal and intrapartum course. She is 4 weeks postpartum. Patient is bottle feeding. Her bleeding is heavy. Patient's pain is 3 out of 10 on the pain scale. Patient is sexually active. Current contraception method is condoms. Postpartum depression screening questionnaire provided to patient and is positive.
discharge (blood) present. Cervix: Normal.      Uterus: Normal.       Adnexa: Right adnexa normal and left adnexa normal.   Musculoskeletal: Normal range of motion. Skin:     General: Skin is dry. Neurological:      Mental Status: She is alert and oriented to person, place, and time. Psychiatric:         Behavior: Behavior normal.         Thought Content: Thought content normal.         Assessment:      Patient 6 weeks postpartum with postpartum depression and normal exam.  Will treat with Prozac. Patient requests to be scheduled for insertion of Nexplanon. Plan:      Orders Placed This Encounter   Medications    FLUoxetine (PROZAC) 20 MG capsule     Sig: Take 1 capsule by mouth daily     Dispense:  30 capsule     Refill:  3      Appointment for insertion of Nexplanon. Olya Carmona am scribing for, and in the presence of Dr. Herb Merlin. Electronically signed by: Lexi Roche 12/30/19 11:40 AM       I agree to the above documentation placed by my scribe eLxi Roche. I reviewed the scribe's note and agree with the documented findings and plan of care. Any areas of disagreement are noted on the chart. I have personally evaluated this patient. Additional findings are as noted. I agree with the chief complaint, past medical history, past surgical history, allergies, medications, social and family history as documented unless otherwise noted below.      Electronically signed by Herb Merlin, MD on 1/1/2020 at 2:43 PM

## 2020-01-02 ENCOUNTER — APPOINTMENT (OUTPATIENT)
Dept: CT IMAGING | Age: 20
DRG: 418 | End: 2020-01-02
Payer: COMMERCIAL

## 2020-01-02 ENCOUNTER — HOSPITAL ENCOUNTER (INPATIENT)
Age: 20
LOS: 2 days | Discharge: HOME | DRG: 418 | End: 2020-01-08
Attending: EMERGENCY MEDICINE | Admitting: FAMILY MEDICINE
Payer: COMMERCIAL

## 2020-01-02 PROBLEM — K81.9 CHOLECYSTITIS: Status: ACTIVE | Noted: 2020-01-02

## 2020-01-02 LAB
-: NORMAL
ABSOLUTE EOS #: 0.1 K/UL (ref 0–0.4)
ABSOLUTE IMMATURE GRANULOCYTE: ABNORMAL K/UL (ref 0–0.3)
ABSOLUTE LYMPH #: 2.2 K/UL (ref 1.2–5.2)
ABSOLUTE MONO #: 0.8 K/UL (ref 0.1–1.3)
ALBUMIN SERPL-MCNC: 4.1 G/DL (ref 3.5–5.2)
ALBUMIN/GLOBULIN RATIO: ABNORMAL (ref 1–2.5)
ALP BLD-CCNC: 190 U/L (ref 35–104)
ALT SERPL-CCNC: 284 U/L (ref 5–33)
AMORPHOUS: NORMAL
ANION GAP SERPL CALCULATED.3IONS-SCNC: 13 MMOL/L (ref 9–17)
AST SERPL-CCNC: 449 U/L
BACTERIA: NORMAL
BASOPHILS # BLD: 0 % (ref 0–2)
BASOPHILS ABSOLUTE: 0 K/UL (ref 0–0.2)
BILIRUB SERPL-MCNC: 1.87 MG/DL (ref 0.3–1.2)
BILIRUBIN URINE: ABNORMAL
BUN BLDV-MCNC: 11 MG/DL (ref 6–20)
BUN/CREAT BLD: ABNORMAL (ref 9–20)
CALCIUM SERPL-MCNC: 9.1 MG/DL (ref 8.6–10.4)
CASTS UA: NORMAL /LPF
CHLORIDE BLD-SCNC: 102 MMOL/L (ref 98–107)
CO2: 20 MMOL/L (ref 20–31)
COLOR: ABNORMAL
COMMENT UA: ABNORMAL
CREAT SERPL-MCNC: 0.57 MG/DL (ref 0.5–0.9)
CRYSTALS, UA: NORMAL /HPF
DIFFERENTIAL TYPE: ABNORMAL
EOSINOPHILS RELATIVE PERCENT: 1 % (ref 0–4)
EPITHELIAL CELLS UA: NORMAL /HPF
GFR AFRICAN AMERICAN: >60 ML/MIN
GFR NON-AFRICAN AMERICAN: >60 ML/MIN
GFR SERPL CREATININE-BSD FRML MDRD: ABNORMAL ML/MIN/{1.73_M2}
GFR SERPL CREATININE-BSD FRML MDRD: ABNORMAL ML/MIN/{1.73_M2}
GLUCOSE BLD-MCNC: 106 MG/DL (ref 70–99)
GLUCOSE URINE: NEGATIVE
HCG QUALITATIVE: NEGATIVE
HCT VFR BLD CALC: 45 % (ref 36–46)
HEMOGLOBIN: 15.2 G/DL (ref 12–16)
IMMATURE GRANULOCYTES: ABNORMAL %
KETONES, URINE: NEGATIVE
LEUKOCYTE ESTERASE, URINE: NEGATIVE
LIPASE: 15 U/L (ref 13–60)
LYMPHOCYTES # BLD: 24 % (ref 25–45)
MCH RBC QN AUTO: 29.8 PG (ref 26–34)
MCHC RBC AUTO-ENTMCNC: 33.7 G/DL (ref 31–37)
MCV RBC AUTO: 88.4 FL (ref 80–100)
MONOCYTES # BLD: 9 % (ref 2–8)
MUCUS: NORMAL
NITRITE, URINE: NEGATIVE
NRBC AUTOMATED: ABNORMAL PER 100 WBC
OTHER OBSERVATIONS UA: NORMAL
PDW BLD-RTO: 13.3 % (ref 11.5–14.9)
PH UA: 5.5 (ref 5–8)
PLATELET # BLD: 311 K/UL (ref 150–450)
PLATELET ESTIMATE: ABNORMAL
PMV BLD AUTO: 7.5 FL (ref 6–12)
POTASSIUM SERPL-SCNC: 4 MMOL/L (ref 3.7–5.3)
PROTEIN UA: NEGATIVE
RBC # BLD: 5.09 M/UL (ref 4–5.2)
RBC # BLD: ABNORMAL 10*6/UL
RBC UA: NORMAL /HPF
RENAL EPITHELIAL, UA: NORMAL /HPF
SEG NEUTROPHILS: 66 % (ref 34–64)
SEGMENTED NEUTROPHILS ABSOLUTE COUNT: 5.9 K/UL (ref 1.3–9.1)
SODIUM BLD-SCNC: 135 MMOL/L (ref 135–144)
SPECIFIC GRAVITY UA: 1.07 (ref 1–1.03)
TOTAL PROTEIN: 8 G/DL (ref 6.4–8.3)
TRICHOMONAS: NORMAL
TURBIDITY: CLEAR
URINE HGB: ABNORMAL
UROBILINOGEN, URINE: NORMAL
WBC # BLD: 9 K/UL (ref 4.5–13.5)
WBC # BLD: ABNORMAL 10*3/UL
WBC UA: NORMAL /HPF
YEAST: NORMAL

## 2020-01-02 PROCEDURE — 96374 THER/PROPH/DIAG INJ IV PUSH: CPT

## 2020-01-02 PROCEDURE — 36415 COLL VENOUS BLD VENIPUNCTURE: CPT

## 2020-01-02 PROCEDURE — 96365 THER/PROPH/DIAG IV INF INIT: CPT

## 2020-01-02 PROCEDURE — 80074 ACUTE HEPATITIS PANEL: CPT

## 2020-01-02 PROCEDURE — 6360000004 HC RX CONTRAST MEDICATION: Performed by: EMERGENCY MEDICINE

## 2020-01-02 PROCEDURE — 96375 TX/PRO/DX INJ NEW DRUG ADDON: CPT

## 2020-01-02 PROCEDURE — 99285 EMERGENCY DEPT VISIT HI MDM: CPT

## 2020-01-02 PROCEDURE — 74177 CT ABD & PELVIS W/CONTRAST: CPT

## 2020-01-02 PROCEDURE — 85025 COMPLETE CBC W/AUTO DIFF WBC: CPT

## 2020-01-02 PROCEDURE — 6360000002 HC RX W HCPCS: Performed by: EMERGENCY MEDICINE

## 2020-01-02 PROCEDURE — 80053 COMPREHEN METABOLIC PANEL: CPT

## 2020-01-02 PROCEDURE — G0378 HOSPITAL OBSERVATION PER HR: HCPCS

## 2020-01-02 PROCEDURE — 81001 URINALYSIS AUTO W/SCOPE: CPT

## 2020-01-02 PROCEDURE — 83690 ASSAY OF LIPASE: CPT

## 2020-01-02 PROCEDURE — 84703 CHORIONIC GONADOTROPIN ASSAY: CPT

## 2020-01-02 PROCEDURE — 2580000003 HC RX 258: Performed by: EMERGENCY MEDICINE

## 2020-01-02 RX ORDER — SODIUM CHLORIDE 0.9 % (FLUSH) 0.9 %
10 SYRINGE (ML) INJECTION EVERY 12 HOURS SCHEDULED
Status: DISCONTINUED | OUTPATIENT
Start: 2020-01-03 | End: 2020-01-08 | Stop reason: HOSPADM

## 2020-01-02 RX ORDER — DIPHENHYDRAMINE HYDROCHLORIDE 50 MG/ML
25 INJECTION INTRAMUSCULAR; INTRAVENOUS ONCE
Status: COMPLETED | OUTPATIENT
Start: 2020-01-02 | End: 2020-01-02

## 2020-01-02 RX ORDER — METOCLOPRAMIDE HYDROCHLORIDE 5 MG/ML
10 INJECTION INTRAMUSCULAR; INTRAVENOUS ONCE
Status: COMPLETED | OUTPATIENT
Start: 2020-01-02 | End: 2020-01-02

## 2020-01-02 RX ORDER — 0.9 % SODIUM CHLORIDE 0.9 %
80 INTRAVENOUS SOLUTION INTRAVENOUS ONCE
Status: COMPLETED | OUTPATIENT
Start: 2020-01-02 | End: 2020-01-02

## 2020-01-02 RX ORDER — MORPHINE SULFATE 4 MG/ML
4 INJECTION, SOLUTION INTRAMUSCULAR; INTRAVENOUS ONCE
Status: COMPLETED | OUTPATIENT
Start: 2020-01-02 | End: 2020-01-02

## 2020-01-02 RX ORDER — SODIUM CHLORIDE 9 MG/ML
1000 INJECTION, SOLUTION INTRAVENOUS CONTINUOUS
Status: DISCONTINUED | OUTPATIENT
Start: 2020-01-02 | End: 2020-01-06

## 2020-01-02 RX ORDER — SODIUM CHLORIDE 0.9 % (FLUSH) 0.9 %
10 SYRINGE (ML) INJECTION PRN
Status: DISCONTINUED | OUTPATIENT
Start: 2020-01-02 | End: 2020-01-08 | Stop reason: HOSPADM

## 2020-01-02 RX ORDER — KETOROLAC TROMETHAMINE 30 MG/ML
30 INJECTION, SOLUTION INTRAMUSCULAR; INTRAVENOUS ONCE
Status: COMPLETED | OUTPATIENT
Start: 2020-01-02 | End: 2020-01-02

## 2020-01-02 RX ORDER — SODIUM CHLORIDE 0.9 % (FLUSH) 0.9 %
10 SYRINGE (ML) INJECTION ONCE
Status: COMPLETED | OUTPATIENT
Start: 2020-01-02 | End: 2020-01-02

## 2020-01-02 RX ORDER — ACETAMINOPHEN 325 MG/1
650 TABLET ORAL EVERY 4 HOURS PRN
Status: DISCONTINUED | OUTPATIENT
Start: 2020-01-02 | End: 2020-01-08 | Stop reason: HOSPADM

## 2020-01-02 RX ADMIN — METOCLOPRAMIDE 10 MG: 5 INJECTION, SOLUTION INTRAMUSCULAR; INTRAVENOUS at 21:38

## 2020-01-02 RX ADMIN — MORPHINE SULFATE 4 MG: 4 INJECTION, SOLUTION INTRAMUSCULAR; INTRAVENOUS at 21:35

## 2020-01-02 RX ADMIN — SODIUM CHLORIDE 80 ML: 9 INJECTION, SOLUTION INTRAVENOUS at 21:56

## 2020-01-02 RX ADMIN — KETOROLAC TROMETHAMINE 30 MG: 30 INJECTION, SOLUTION INTRAMUSCULAR at 21:39

## 2020-01-02 RX ADMIN — PIPERACILLIN SODIUM AND TAZOBACTAM SODIUM 3.38 G: 3; .375 INJECTION, POWDER, LYOPHILIZED, FOR SOLUTION INTRAVENOUS at 22:33

## 2020-01-02 RX ADMIN — IOPAMIDOL 75 ML: 755 INJECTION, SOLUTION INTRAVENOUS at 21:56

## 2020-01-02 RX ADMIN — Medication 10 ML: at 21:56

## 2020-01-02 RX ADMIN — DIPHENHYDRAMINE HYDROCHLORIDE 25 MG: 50 INJECTION INTRAMUSCULAR; INTRAVENOUS at 21:33

## 2020-01-02 RX ADMIN — SODIUM CHLORIDE 1000 ML: 9 INJECTION, SOLUTION INTRAVENOUS at 23:11

## 2020-01-02 ASSESSMENT — PAIN DESCRIPTION - PAIN TYPE
TYPE: ACUTE PAIN
TYPE: ACUTE PAIN

## 2020-01-02 ASSESSMENT — PAIN DESCRIPTION - LOCATION
LOCATION: ABDOMEN
LOCATION: ABDOMEN

## 2020-01-02 ASSESSMENT — PAIN SCALES - GENERAL
PAINLEVEL_OUTOF10: 10
PAINLEVEL_OUTOF10: 3
PAINLEVEL_OUTOF10: 2

## 2020-01-02 ASSESSMENT — PAIN DESCRIPTION - ORIENTATION
ORIENTATION: UPPER
ORIENTATION: UPPER

## 2020-01-03 ENCOUNTER — APPOINTMENT (OUTPATIENT)
Dept: MRI IMAGING | Age: 20
DRG: 418 | End: 2020-01-03
Payer: COMMERCIAL

## 2020-01-03 ENCOUNTER — APPOINTMENT (OUTPATIENT)
Dept: ULTRASOUND IMAGING | Age: 20
DRG: 418 | End: 2020-01-03
Payer: COMMERCIAL

## 2020-01-03 PROBLEM — Z3A.40 40 WEEKS GESTATION OF PREGNANCY: Status: RESOLVED | Noted: 2019-11-14 | Resolved: 2020-01-03

## 2020-01-03 LAB
ABSOLUTE EOS #: 0.1 K/UL (ref 0–0.4)
ABSOLUTE IMMATURE GRANULOCYTE: ABNORMAL K/UL (ref 0–0.3)
ABSOLUTE LYMPH #: 1.4 K/UL (ref 1.2–5.2)
ABSOLUTE MONO #: 0.8 K/UL (ref 0.1–1.3)
ALBUMIN SERPL-MCNC: 3.5 G/DL (ref 3.5–5.2)
ALBUMIN/GLOBULIN RATIO: ABNORMAL (ref 1–2.5)
ALP BLD-CCNC: 260 U/L (ref 35–104)
ALT SERPL-CCNC: 450 U/L (ref 5–33)
ANION GAP SERPL CALCULATED.3IONS-SCNC: 11 MMOL/L (ref 9–17)
AST SERPL-CCNC: 463 U/L
BASOPHILS # BLD: 0 % (ref 0–2)
BASOPHILS ABSOLUTE: 0 K/UL (ref 0–0.2)
BILIRUB SERPL-MCNC: 3.85 MG/DL (ref 0.3–1.2)
BILIRUBIN DIRECT: 3.84 MG/DL
BILIRUBIN, INDIRECT: 0.01 MG/DL (ref 0–1)
BUN BLDV-MCNC: 9 MG/DL (ref 6–20)
BUN/CREAT BLD: ABNORMAL (ref 9–20)
CALCIUM SERPL-MCNC: 8.3 MG/DL (ref 8.6–10.4)
CHLORIDE BLD-SCNC: 104 MMOL/L (ref 98–107)
CO2: 23 MMOL/L (ref 20–31)
CREAT SERPL-MCNC: 0.55 MG/DL (ref 0.5–0.9)
DIFFERENTIAL TYPE: ABNORMAL
EOSINOPHILS RELATIVE PERCENT: 1 % (ref 0–4)
GFR AFRICAN AMERICAN: >60 ML/MIN
GFR NON-AFRICAN AMERICAN: >60 ML/MIN
GFR SERPL CREATININE-BSD FRML MDRD: ABNORMAL ML/MIN/{1.73_M2}
GFR SERPL CREATININE-BSD FRML MDRD: ABNORMAL ML/MIN/{1.73_M2}
GLOBULIN: ABNORMAL G/DL (ref 1.5–3.8)
GLUCOSE BLD-MCNC: 101 MG/DL (ref 70–99)
HAV IGM SER IA-ACNC: NONREACTIVE
HCT VFR BLD CALC: 41.2 % (ref 36–46)
HEMOGLOBIN: 14.1 G/DL (ref 12–16)
HEPATITIS B CORE IGM ANTIBODY: NONREACTIVE
HEPATITIS B SURFACE ANTIGEN: NONREACTIVE
HEPATITIS C ANTIBODY: NONREACTIVE
IMMATURE GRANULOCYTES: ABNORMAL %
LYMPHOCYTES # BLD: 16 % (ref 25–45)
MCH RBC QN AUTO: 29.6 PG (ref 26–34)
MCHC RBC AUTO-ENTMCNC: 34.3 G/DL (ref 31–37)
MCV RBC AUTO: 86.2 FL (ref 80–100)
MONOCYTES # BLD: 9 % (ref 2–8)
NRBC AUTOMATED: ABNORMAL PER 100 WBC
PDW BLD-RTO: 13.1 % (ref 11.5–14.9)
PLATELET # BLD: 270 K/UL (ref 150–450)
PLATELET ESTIMATE: ABNORMAL
PMV BLD AUTO: 7.8 FL (ref 6–12)
POTASSIUM SERPL-SCNC: 4.2 MMOL/L (ref 3.7–5.3)
RBC # BLD: 4.78 M/UL (ref 4–5.2)
RBC # BLD: ABNORMAL 10*6/UL
SEG NEUTROPHILS: 74 % (ref 34–64)
SEGMENTED NEUTROPHILS ABSOLUTE COUNT: 6.5 K/UL (ref 1.3–9.1)
SODIUM BLD-SCNC: 138 MMOL/L (ref 135–144)
TOTAL PROTEIN: 6.9 G/DL (ref 6.4–8.3)
WBC # BLD: 8.8 K/UL (ref 4.5–13.5)
WBC # BLD: ABNORMAL 10*3/UL

## 2020-01-03 PROCEDURE — 99254 IP/OBS CNSLTJ NEW/EST MOD 60: CPT | Performed by: INTERNAL MEDICINE

## 2020-01-03 PROCEDURE — G0378 HOSPITAL OBSERVATION PER HR: HCPCS

## 2020-01-03 PROCEDURE — APPNB30 APP NON BILLABLE TIME 0-30 MINS: Performed by: NURSE PRACTITIONER

## 2020-01-03 PROCEDURE — 80076 HEPATIC FUNCTION PANEL: CPT

## 2020-01-03 PROCEDURE — 85025 COMPLETE CBC W/AUTO DIFF WBC: CPT

## 2020-01-03 PROCEDURE — 96375 TX/PRO/DX INJ NEW DRUG ADDON: CPT

## 2020-01-03 PROCEDURE — 96376 TX/PRO/DX INJ SAME DRUG ADON: CPT

## 2020-01-03 PROCEDURE — 6360000004 HC RX CONTRAST MEDICATION: Performed by: INTERNAL MEDICINE

## 2020-01-03 PROCEDURE — 99219 PR INITIAL OBSERVATION CARE/DAY 50 MINUTES: CPT | Performed by: FAMILY MEDICINE

## 2020-01-03 PROCEDURE — 2500000003 HC RX 250 WO HCPCS: Performed by: SURGERY

## 2020-01-03 PROCEDURE — 6360000002 HC RX W HCPCS: Performed by: SURGERY

## 2020-01-03 PROCEDURE — A9579 GAD-BASE MR CONTRAST NOS,1ML: HCPCS | Performed by: INTERNAL MEDICINE

## 2020-01-03 PROCEDURE — 74183 MRI ABD W/O CNTR FLWD CNTR: CPT

## 2020-01-03 PROCEDURE — 2580000003 HC RX 258: Performed by: EMERGENCY MEDICINE

## 2020-01-03 PROCEDURE — 6360000002 HC RX W HCPCS: Performed by: FAMILY MEDICINE

## 2020-01-03 PROCEDURE — 76705 ECHO EXAM OF ABDOMEN: CPT

## 2020-01-03 PROCEDURE — 80048 BASIC METABOLIC PNL TOTAL CA: CPT

## 2020-01-03 PROCEDURE — 2580000003 HC RX 258: Performed by: SURGERY

## 2020-01-03 PROCEDURE — 2580000003 HC RX 258: Performed by: INTERNAL MEDICINE

## 2020-01-03 PROCEDURE — 96366 THER/PROPH/DIAG IV INF ADDON: CPT

## 2020-01-03 PROCEDURE — 36415 COLL VENOUS BLD VENIPUNCTURE: CPT

## 2020-01-03 RX ORDER — 0.9 % SODIUM CHLORIDE 0.9 %
30 INTRAVENOUS SOLUTION INTRAVENOUS ONCE
Status: COMPLETED | OUTPATIENT
Start: 2020-01-03 | End: 2020-01-03

## 2020-01-03 RX ORDER — KETOROLAC TROMETHAMINE 30 MG/ML
30 INJECTION, SOLUTION INTRAMUSCULAR; INTRAVENOUS EVERY 6 HOURS PRN
Status: DISCONTINUED | OUTPATIENT
Start: 2020-01-03 | End: 2020-01-08 | Stop reason: HOSPADM

## 2020-01-03 RX ORDER — PROMETHAZINE HYDROCHLORIDE 25 MG/ML
12.5 INJECTION, SOLUTION INTRAMUSCULAR; INTRAVENOUS EVERY 6 HOURS PRN
Status: DISCONTINUED | OUTPATIENT
Start: 2020-01-03 | End: 2020-01-03 | Stop reason: SDUPTHER

## 2020-01-03 RX ORDER — FENTANYL CITRATE 50 UG/ML
100 INJECTION, SOLUTION INTRAMUSCULAR; INTRAVENOUS
Status: DISCONTINUED | OUTPATIENT
Start: 2020-01-03 | End: 2020-01-03

## 2020-01-03 RX ORDER — FENTANYL CITRATE 50 UG/ML
50 INJECTION, SOLUTION INTRAMUSCULAR; INTRAVENOUS
Status: DISCONTINUED | OUTPATIENT
Start: 2020-01-03 | End: 2020-01-03

## 2020-01-03 RX ORDER — SODIUM CHLORIDE 0.9 % (FLUSH) 0.9 %
10 SYRINGE (ML) INJECTION PRN
Status: DISCONTINUED | OUTPATIENT
Start: 2020-01-03 | End: 2020-01-03

## 2020-01-03 RX ADMIN — FENTANYL CITRATE 50 MCG: 50 INJECTION INTRAMUSCULAR; INTRAVENOUS at 05:47

## 2020-01-03 RX ADMIN — PIPERACILLIN SODIUM AND TAZOBACTAM SODIUM 3.38 G: 3; .375 INJECTION, POWDER, LYOPHILIZED, FOR SOLUTION INTRAVENOUS at 20:31

## 2020-01-03 RX ADMIN — PIPERACILLIN SODIUM AND TAZOBACTAM SODIUM 3.38 G: 3; .375 INJECTION, POWDER, LYOPHILIZED, FOR SOLUTION INTRAVENOUS at 11:49

## 2020-01-03 RX ADMIN — SODIUM CHLORIDE 1000 ML: 9 INJECTION, SOLUTION INTRAVENOUS at 05:47

## 2020-01-03 RX ADMIN — FENTANYL CITRATE 100 MCG: 50 INJECTION, SOLUTION INTRAMUSCULAR; INTRAVENOUS at 08:00

## 2020-01-03 RX ADMIN — FENTANYL CITRATE 100 MCG: 50 INJECTION, SOLUTION INTRAMUSCULAR; INTRAVENOUS at 10:28

## 2020-01-03 RX ADMIN — FENTANYL CITRATE 100 MCG: 50 INJECTION, SOLUTION INTRAMUSCULAR; INTRAVENOUS at 14:50

## 2020-01-03 RX ADMIN — FENTANYL CITRATE 100 MCG: 50 INJECTION, SOLUTION INTRAMUSCULAR; INTRAVENOUS at 19:19

## 2020-01-03 RX ADMIN — SODIUM CHLORIDE 30 ML: 9 INJECTION, SOLUTION INTRAVENOUS at 10:09

## 2020-01-03 RX ADMIN — FENTANYL CITRATE 100 MCG: 50 INJECTION, SOLUTION INTRAMUSCULAR; INTRAVENOUS at 12:46

## 2020-01-03 RX ADMIN — KETOROLAC TROMETHAMINE 30 MG: 30 INJECTION, SOLUTION INTRAMUSCULAR at 21:37

## 2020-01-03 RX ADMIN — FAMOTIDINE 20 MG: 10 INJECTION, SOLUTION INTRAVENOUS at 18:29

## 2020-01-03 RX ADMIN — Medication 10 ML: at 10:10

## 2020-01-03 RX ADMIN — PROMETHAZINE HYDROCHLORIDE 12.5 MG: 25 INJECTION INTRAMUSCULAR; INTRAVENOUS at 18:32

## 2020-01-03 RX ADMIN — FAMOTIDINE 20 MG: 10 INJECTION, SOLUTION INTRAVENOUS at 05:49

## 2020-01-03 RX ADMIN — GADOTERIDOL 15 ML: 279.3 INJECTION, SOLUTION INTRAVENOUS at 10:10

## 2020-01-03 RX ADMIN — FENTANYL CITRATE 100 MCG: 50 INJECTION, SOLUTION INTRAMUSCULAR; INTRAVENOUS at 16:53

## 2020-01-03 RX ADMIN — PIPERACILLIN SODIUM AND TAZOBACTAM SODIUM 3.38 G: 3; .375 INJECTION, POWDER, LYOPHILIZED, FOR SOLUTION INTRAVENOUS at 05:49

## 2020-01-03 RX ADMIN — FENTANYL CITRATE 50 MCG: 50 INJECTION INTRAMUSCULAR; INTRAVENOUS at 01:31

## 2020-01-03 ASSESSMENT — PAIN DESCRIPTION - ORIENTATION
ORIENTATION: UPPER
ORIENTATION: OTHER (COMMENT)
ORIENTATION: UPPER

## 2020-01-03 ASSESSMENT — PAIN DESCRIPTION - PAIN TYPE
TYPE: ACUTE PAIN

## 2020-01-03 ASSESSMENT — PAIN SCALES - GENERAL
PAINLEVEL_OUTOF10: 6
PAINLEVEL_OUTOF10: 4
PAINLEVEL_OUTOF10: 6
PAINLEVEL_OUTOF10: 6
PAINLEVEL_OUTOF10: 10
PAINLEVEL_OUTOF10: 10
PAINLEVEL_OUTOF10: 7
PAINLEVEL_OUTOF10: 5
PAINLEVEL_OUTOF10: 7
PAINLEVEL_OUTOF10: 8
PAINLEVEL_OUTOF10: 2
PAINLEVEL_OUTOF10: 10
PAINLEVEL_OUTOF10: 8
PAINLEVEL_OUTOF10: 0

## 2020-01-03 ASSESSMENT — PAIN DESCRIPTION - LOCATION
LOCATION: ABDOMEN

## 2020-01-03 ASSESSMENT — PAIN DESCRIPTION - DESCRIPTORS: DESCRIPTORS: SHARP
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
## 2020-01-03 NOTE — CARE COORDINATION
CASE MANAGEMENT NOTE:    Admission Date:  1/2/2020 Kym Jay is a 20 y.o.  female    Admitted for : Cholecystitis [K81.9]    Met with:  Patient and Family    PCP:  Dr. Fei Szymanski:  Mitali Mac:  independently at home with her boyfriend and drives. Current Services PTA:  No    Is patient agreeable to VNS: No    Freedom of choice provided: Yes    List of [address] provided: No    VNS chosen:  NA    DME:  none    Home Oxygen: No    Nebulizer: No    CPAP/BIPAP: No    Supplier: N/A    Potential Assistance Needed: No    SNF needed: No    Ph[facility]:  [facility]       Is the Patient an [employer] with Readmission Risk Score greater than 14%? No  If yes, pt needs a follow up appointment made within 7 days. Does Patient want to use MEDS to BEDS? No    Family Members/Caregivers that pt would like involved in their care:    Yes    If yes, list name here:  Justice [phone]    Transportation Provider:  Patient and Family             Is patient in Isolation/One on One/Altered Mental Status? No  If yes, skip next question. If no, would they like an I-Pad to  use? No  If yes, call [phone]. Discharge Plan:  Home without needs.                  Electronically signed by: Ashish Yen RN on 1/3/2020 at 3:37 PM
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
## 2020-01-03 NOTE — PROGRESS NOTES
Please complete a MRI screening form with the patient and fax to MRI at the earliest convenience. MRI will schedule time slot for exam completion once this is received.

## 2020-01-03 NOTE — CARE COORDINATION
[facility]  DVT Prophylaxis and Vaccine Status  Work List  Mandatory for all patients      Patient must be on both Chemical prophylaxis and Mechanical prophylaxis.  If chemical/mechanical prophylaxis is not ordered, the physician must document a reason for not using prophylaxis     Chemical Prophylaxis  Is patient on chemical prophylaxis: Yes  If no chemical prophylaxis Is a order in for No Chemical VTE prophylaxisYes  If no was the physician notified not applicable      Mechanical Prophylaxis  Is patient on mechanical prophylaxis, intermittent pneumatic compression device: Yes  If no was the physician notified not applicable        Pneumonia Vaccine  Vaccine indicated:  Not indicated  If indicated was the vaccine given: not applicable    Influenza Vaccine (applicable from October through March):  Vaccine indicated: Vaccination refused  If indicated was the vaccine given: not applicable    Patient Education  Education completed on DVT prophylaxis: yes

## 2020-01-04 LAB
ABSOLUTE EOS #: 0.1 K/UL (ref 0–0.4)
ABSOLUTE IMMATURE GRANULOCYTE: ABNORMAL K/UL (ref 0–0.3)
ABSOLUTE LYMPH #: 1.8 K/UL (ref 1.2–5.2)
ABSOLUTE MONO #: 0.9 K/UL (ref 0.1–1.3)
ALBUMIN SERPL-MCNC: 3.2 G/DL (ref 3.5–5.2)
ALBUMIN/GLOBULIN RATIO: ABNORMAL (ref 1–2.5)
ALP BLD-CCNC: 336 U/L (ref 35–104)
ALT SERPL-CCNC: 326 U/L (ref 5–33)
ANION GAP SERPL CALCULATED.3IONS-SCNC: 10 MMOL/L (ref 9–17)
AST SERPL-CCNC: 174 U/L
BASOPHILS # BLD: 1 % (ref 0–2)
BASOPHILS ABSOLUTE: 0 K/UL (ref 0–0.2)
BILIRUB SERPL-MCNC: 5.65 MG/DL (ref 0.3–1.2)
BILIRUBIN DIRECT: 5.6 MG/DL
BILIRUBIN, INDIRECT: 0.05 MG/DL (ref 0–1)
BUN BLDV-MCNC: 5 MG/DL (ref 6–20)
BUN/CREAT BLD: ABNORMAL (ref 9–20)
CALCIUM SERPL-MCNC: 8.2 MG/DL (ref 8.6–10.4)
CHLORIDE BLD-SCNC: 104 MMOL/L (ref 98–107)
CO2: 23 MMOL/L (ref 20–31)
CREAT SERPL-MCNC: 0.45 MG/DL (ref 0.5–0.9)
DIFFERENTIAL TYPE: ABNORMAL
EOSINOPHILS RELATIVE PERCENT: 2 % (ref 0–4)
GFR AFRICAN AMERICAN: >60 ML/MIN
GFR NON-AFRICAN AMERICAN: >60 ML/MIN
GFR SERPL CREATININE-BSD FRML MDRD: ABNORMAL ML/MIN/{1.73_M2}
GFR SERPL CREATININE-BSD FRML MDRD: ABNORMAL ML/MIN/{1.73_M2}
GLOBULIN: ABNORMAL G/DL (ref 1.5–3.8)
GLUCOSE BLD-MCNC: 93 MG/DL (ref 70–99)
HCT VFR BLD CALC: 37.5 % (ref 36–46)
HEMOGLOBIN: 13 G/DL (ref 12–16)
IMMATURE GRANULOCYTES: ABNORMAL %
LYMPHOCYTES # BLD: 25 % (ref 25–45)
MCH RBC QN AUTO: 30.4 PG (ref 26–34)
MCHC RBC AUTO-ENTMCNC: 34.6 G/DL (ref 31–37)
MCV RBC AUTO: 87.8 FL (ref 80–100)
MONOCYTES # BLD: 13 % (ref 2–8)
NRBC AUTOMATED: ABNORMAL PER 100 WBC
PDW BLD-RTO: 12.9 % (ref 11.5–14.9)
PLATELET # BLD: 241 K/UL (ref 150–450)
PLATELET ESTIMATE: ABNORMAL
PMV BLD AUTO: 7.6 FL (ref 6–12)
POTASSIUM SERPL-SCNC: 4.2 MMOL/L (ref 3.7–5.3)
RBC # BLD: 4.27 M/UL (ref 4–5.2)
RBC # BLD: ABNORMAL 10*6/UL
SEG NEUTROPHILS: 59 % (ref 34–64)
SEGMENTED NEUTROPHILS ABSOLUTE COUNT: 4.4 K/UL (ref 1.3–9.1)
SODIUM BLD-SCNC: 137 MMOL/L (ref 135–144)
TOTAL PROTEIN: 6.6 G/DL (ref 6.4–8.3)
WBC # BLD: 7.3 K/UL (ref 4.5–13.5)
WBC # BLD: ABNORMAL 10*3/UL

## 2020-01-04 PROCEDURE — 2500000003 HC RX 250 WO HCPCS: Performed by: SURGERY

## 2020-01-04 PROCEDURE — 86645 CMV ANTIBODY IGM: CPT

## 2020-01-04 PROCEDURE — 2580000003 HC RX 258: Performed by: SURGERY

## 2020-01-04 PROCEDURE — 96376 TX/PRO/DX INJ SAME DRUG ADON: CPT

## 2020-01-04 PROCEDURE — 6360000002 HC RX W HCPCS: Performed by: FAMILY MEDICINE

## 2020-01-04 PROCEDURE — 80048 BASIC METABOLIC PNL TOTAL CA: CPT

## 2020-01-04 PROCEDURE — 85025 COMPLETE CBC W/AUTO DIFF WBC: CPT

## 2020-01-04 PROCEDURE — 80076 HEPATIC FUNCTION PANEL: CPT

## 2020-01-04 PROCEDURE — 2580000003 HC RX 258: Performed by: EMERGENCY MEDICINE

## 2020-01-04 PROCEDURE — 96366 THER/PROPH/DIAG IV INF ADDON: CPT

## 2020-01-04 PROCEDURE — 36415 COLL VENOUS BLD VENIPUNCTURE: CPT

## 2020-01-04 PROCEDURE — 99226 PR SBSQ OBSERVATION CARE/DAY 35 MINUTES: CPT | Performed by: INTERNAL MEDICINE

## 2020-01-04 PROCEDURE — 99232 SBSQ HOSP IP/OBS MODERATE 35: CPT | Performed by: FAMILY MEDICINE

## 2020-01-04 PROCEDURE — G0378 HOSPITAL OBSERVATION PER HR: HCPCS

## 2020-01-04 PROCEDURE — 6360000002 HC RX W HCPCS: Performed by: SURGERY

## 2020-01-04 RX ADMIN — KETOROLAC TROMETHAMINE 30 MG: 30 INJECTION, SOLUTION INTRAMUSCULAR at 03:51

## 2020-01-04 RX ADMIN — PIPERACILLIN SODIUM AND TAZOBACTAM SODIUM 3.38 G: 3; .375 INJECTION, POWDER, LYOPHILIZED, FOR SOLUTION INTRAVENOUS at 03:50

## 2020-01-04 RX ADMIN — PIPERACILLIN SODIUM AND TAZOBACTAM SODIUM 3.38 G: 3; .375 INJECTION, POWDER, LYOPHILIZED, FOR SOLUTION INTRAVENOUS at 12:55

## 2020-01-04 RX ADMIN — KETOROLAC TROMETHAMINE 30 MG: 30 INJECTION, SOLUTION INTRAMUSCULAR at 21:53

## 2020-01-04 RX ADMIN — FAMOTIDINE 20 MG: 10 INJECTION, SOLUTION INTRAVENOUS at 07:16

## 2020-01-04 RX ADMIN — FAMOTIDINE 20 MG: 10 INJECTION, SOLUTION INTRAVENOUS at 21:53

## 2020-01-04 RX ADMIN — SODIUM CHLORIDE 1000 ML: 9 INJECTION, SOLUTION INTRAVENOUS at 07:31

## 2020-01-04 RX ADMIN — KETOROLAC TROMETHAMINE 30 MG: 30 INJECTION, SOLUTION INTRAMUSCULAR at 16:03

## 2020-01-04 RX ADMIN — KETOROLAC TROMETHAMINE 30 MG: 30 INJECTION, SOLUTION INTRAMUSCULAR at 09:48

## 2020-01-04 RX ADMIN — PIPERACILLIN SODIUM AND TAZOBACTAM SODIUM 3.38 G: 3; .375 INJECTION, POWDER, LYOPHILIZED, FOR SOLUTION INTRAVENOUS at 21:54

## 2020-01-04 ASSESSMENT — PAIN SCALES - GENERAL
PAINLEVEL_OUTOF10: 5
PAINLEVEL_OUTOF10: 6
PAINLEVEL_OUTOF10: 7

## 2020-01-04 NOTE — PROGRESS NOTES
Dr Julia Boone rounds and states patient can be placed on soft diet. Reviewed MRI and hep panel which was nonreactive. All questions answered for patient.
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
## 2020-01-04 NOTE — CARE COORDINATION
ONGOING DISCHARGE PLAN:    Spoke with patient regarding discharge plan and patient confirms that plan is to go home with no needs. Patient gave birth to a baby boy on 11/15/2020    Per Gen surg notes - I strongly believe patient has hepatocellular disease and no acute gallbladder issues at this time. Continue medical management. Await GI input. Once GI work-up is complete and liver battery improves patient eventually will need elective cholecystectomy. Remains on IV zosyn, Iv fluids    Labs:  , , total bilirubin 5.65    Low fiber diet. Will continue to follow for additional discharge needs.     Electronically signed by George Olson RN on 1/4/2020 at 12:44 PM

## 2020-01-04 NOTE — PLAN OF CARE
Problem: Pain:  Goal: Pain level will decrease  Description  Pain level will decrease  1/4/2020 1843 by Rhiannon Martinez RN  Outcome: Ongoing  Note:   Pain is well controlled with current regimen. See MAR.  1/4/2020 0722 by Fransico Dangelo RN  Outcome: Ongoing  1/4/2020 0721 by Fransico Dangelo RN  Outcome: Ongoing  Goal: Control of acute pain  Description  Control of acute pain  1/4/2020 1843 by Rhiannon Martinez RN  Outcome: Ongoing  1/4/2020 0722 by Fransico Dangelo RN  Outcome: Ongoing  1/4/2020 0721 by Fransico Dangelo RN  Outcome: Ongoing  Goal: Control of chronic pain  Description  Control of chronic pain  1/4/2020 1843 by Rhiannon Martinez RN  Outcome: Ongoing  1/4/2020 0722 by Fransico Dangelo RN  Outcome: Ongoing  1/4/2020 0721 by Fransico Dangelo RN  Outcome: Ongoing     Problem: Falls - Risk of:  Goal: Will remain free from falls  Description  Will remain free from falls  Outcome: Ongoing  Note:   Patient is free of falls and injuries throughout shift. Bed remains in the lowest position, wheels locked, call light and bedside table are within reach.   Goal: Absence of physical injury  Description  Absence of physical injury  Outcome: Ongoing

## 2020-01-04 NOTE — PLAN OF CARE
Problem: Pain:  Goal: Pain level will decrease  Description  Pain level will decrease  Outcome: Ongoing  Note:   Pain decreased with prescribed medication.

## 2020-01-04 NOTE — PROGRESS NOTES
GI Progress notes    1/4/2020   2:25 PM    Name:  Dominic Peralta  MRN:    [id number]     Acct:     [de-identified]   Room:  2050/2050-01   Day: 0     Admit Date: 1/2/2020  9:11 PM  PCP: Aroldo Douglass MD    Subjective:     C/C:   Chief Complaint   Patient presents with    Abdominal Pain       Interval History: Status: improved. Patient was seen accompanied by her father in the room  Previous entries were noted and care was discussed with my partner who followed until yesterday  Abdominal pain is little better apparently has been on pain medicines as well  Liver enzymes are still elevated  Hep screen is negative  For the liver disease work-up is pending  Imaging studies were reviewed with her    ROS:  Constitutional: negative for chills, fevers and sweats    Gastrointestinal: Positive abdominal pain, constipation, diarrhea, nausea and vomiting  Neurological: negative for dizziness and headaches    Medications: Allergies:    Allergies   Allergen Reactions    Zofran [Ondansetron Hcl] Hives       Current Meds: famotidine (PEPCID) injection 20 mg, BID  promethazine (PHENERGAN) 12.5 mg in sodium chloride 0.9 % 50 mL IVPB, Q6H PRN  piperacillin-tazobactam (ZOSYN) 3.375 g in dextrose 5 % 50 mL IVPB extended infusion (mini-bag), Q8H  ketorolac (TORADOL) injection 30 mg, Q6H PRN  0.9 % sodium chloride infusion, Continuous  sodium chloride flush 0.9 % injection 10 mL, 2 times per day  sodium chloride flush 0.9 % injection 10 mL, PRN  acetaminophen (TYLENOL) tablet 650 mg, Q4H PRN  enoxaparin (LOVENOX) injection 40 mg, Daily        Data:     Code Status:  Full Code    Family History   Problem Relation Age of Onset    Bipolar Disorder Mother     Hypertension Maternal Grandmother     Coronary Art Dis Maternal Grandmother     Depression Paternal Grandmother        Social History     Socioeconomic History    Marital status: Single     Spouse name: Not on file    Number of children: Not on file    Years of RBC 4.27 01/04/2020    HGB 13.0 01/04/2020    HCT 37.5 01/04/2020    MCV 87.8 01/04/2020    MCH 30.4 01/04/2020    MCHC 34.6 01/04/2020    RDW 12.9 01/04/2020     01/04/2020    MPV 7.6 01/04/2020     CBC with Differential:    Lab Results   Component Value Date    WBC 7.3 01/04/2020    RBC 4.27 01/04/2020    HGB 13.0 01/04/2020    HCT 37.5 01/04/2020     01/04/2020    MCV 87.8 01/04/2020    MCH 30.4 01/04/2020    MCHC 34.6 01/04/2020    RDW 12.9 01/04/2020    LYMPHOPCT 25 01/04/2020    MONOPCT 13 01/04/2020    BASOPCT 1 01/04/2020    MONOSABS 0.90 01/04/2020    LYMPHSABS 1.80 01/04/2020    EOSABS 0.10 01/04/2020    BASOSABS 0.00 01/04/2020    DIFFTYPE NOT REPORTED 01/04/2020     Hemoglobin/Hematocrit:    Lab Results   Component Value Date    HGB 13.0 01/04/2020    HCT 37.5 01/04/2020     CMP:    Lab Results   Component Value Date     01/04/2020    K 4.2 01/04/2020     01/04/2020    CO2 23 01/04/2020    BUN 5 01/04/2020    CREATININE 0.45 01/04/2020    GFRAA >60 01/04/2020    LABGLOM >60 01/04/2020    GLUCOSE 93 01/04/2020    PROT 6.6 01/04/2020    LABALBU 3.2 01/04/2020    CALCIUM 8.2 01/04/2020    BILITOT 5.65 01/04/2020    ALKPHOS 336 01/04/2020     01/04/2020     01/04/2020     BMP:    Lab Results   Component Value Date     01/04/2020    K 4.2 01/04/2020     01/04/2020    CO2 23 01/04/2020    BUN 5 01/04/2020    LABALBU 3.2 01/04/2020    CREATININE 0.45 01/04/2020    CALCIUM 8.2 01/04/2020    GFRAA >60 01/04/2020    LABGLOM >60 01/04/2020    GLUCOSE 93 01/04/2020     PT/INR:    Lab Results   Component Value Date    PROTIME 12.2 11/14/2019    INR 0.9 11/14/2019     PTT:    Lab Results   Component Value Date    APTT 27.6 11/14/2019   [APTT}    Physical Examination:        General appearance: alert, cooperative and no distress  Mental Status: oriented to person, place and time and anxious affect    Abdomen: soft, mild epigastric and right upper quadrant tender,

## 2020-01-05 LAB
ABSOLUTE EOS #: 0.2 K/UL (ref 0–0.4)
ABSOLUTE IMMATURE GRANULOCYTE: ABNORMAL K/UL (ref 0–0.3)
ABSOLUTE LYMPH #: 1.8 K/UL (ref 1.2–5.2)
ABSOLUTE MONO #: 0.8 K/UL (ref 0.1–1.3)
ALBUMIN SERPL-MCNC: 3.5 G/DL (ref 3.5–5.2)
ALBUMIN/GLOBULIN RATIO: ABNORMAL (ref 1–2.5)
ALP BLD-CCNC: 309 U/L (ref 35–104)
ALT SERPL-CCNC: 240 U/L (ref 5–33)
ANION GAP SERPL CALCULATED.3IONS-SCNC: 11 MMOL/L (ref 9–17)
AST SERPL-CCNC: 91 U/L
BASOPHILS # BLD: 1 % (ref 0–2)
BASOPHILS ABSOLUTE: 0.1 K/UL (ref 0–0.2)
BILIRUB SERPL-MCNC: 5.35 MG/DL (ref 0.3–1.2)
BILIRUBIN DIRECT: 4.87 MG/DL
BILIRUBIN, INDIRECT: 0.48 MG/DL (ref 0–1)
BUN BLDV-MCNC: 3 MG/DL (ref 6–20)
BUN/CREAT BLD: ABNORMAL (ref 9–20)
CALCIUM SERPL-MCNC: 8.6 MG/DL (ref 8.6–10.4)
CHLORIDE BLD-SCNC: 106 MMOL/L (ref 98–107)
CO2: 24 MMOL/L (ref 20–31)
CREAT SERPL-MCNC: 0.41 MG/DL (ref 0.5–0.9)
DIFFERENTIAL TYPE: ABNORMAL
EOSINOPHILS RELATIVE PERCENT: 3 % (ref 0–4)
GFR AFRICAN AMERICAN: >60 ML/MIN
GFR NON-AFRICAN AMERICAN: >60 ML/MIN
GFR SERPL CREATININE-BSD FRML MDRD: ABNORMAL ML/MIN/{1.73_M2}
GFR SERPL CREATININE-BSD FRML MDRD: ABNORMAL ML/MIN/{1.73_M2}
GLOBULIN: ABNORMAL G/DL (ref 1.5–3.8)
GLUCOSE BLD-MCNC: 97 MG/DL (ref 70–99)
HCT VFR BLD CALC: 38.3 % (ref 36–46)
HEMOGLOBIN: 13.3 G/DL (ref 12–16)
IMMATURE GRANULOCYTES: ABNORMAL %
LYMPHOCYTES # BLD: 30 % (ref 25–45)
MCH RBC QN AUTO: 29.8 PG (ref 26–34)
MCHC RBC AUTO-ENTMCNC: 34.7 G/DL (ref 31–37)
MCV RBC AUTO: 86.1 FL (ref 80–100)
MONOCYTES # BLD: 14 % (ref 2–8)
NRBC AUTOMATED: ABNORMAL PER 100 WBC
PDW BLD-RTO: 12.9 % (ref 11.5–14.9)
PLATELET # BLD: 248 K/UL (ref 150–450)
PLATELET ESTIMATE: ABNORMAL
PMV BLD AUTO: 7.8 FL (ref 6–12)
POTASSIUM SERPL-SCNC: 4 MMOL/L (ref 3.7–5.3)
RBC # BLD: 4.45 M/UL (ref 4–5.2)
RBC # BLD: ABNORMAL 10*6/UL
SEG NEUTROPHILS: 52 % (ref 34–64)
SEGMENTED NEUTROPHILS ABSOLUTE COUNT: 3.3 K/UL (ref 1.3–9.1)
SODIUM BLD-SCNC: 141 MMOL/L (ref 135–144)
TOTAL PROTEIN: 7.1 G/DL (ref 6.4–8.3)
WBC # BLD: 6.2 K/UL (ref 4.5–13.5)
WBC # BLD: ABNORMAL 10*3/UL

## 2020-01-05 PROCEDURE — 2580000003 HC RX 258: Performed by: SURGERY

## 2020-01-05 PROCEDURE — 2500000003 HC RX 250 WO HCPCS: Performed by: SURGERY

## 2020-01-05 PROCEDURE — 80048 BASIC METABOLIC PNL TOTAL CA: CPT

## 2020-01-05 PROCEDURE — 96376 TX/PRO/DX INJ SAME DRUG ADON: CPT

## 2020-01-05 PROCEDURE — 99225 PR SBSQ OBSERVATION CARE/DAY 25 MINUTES: CPT | Performed by: INTERNAL MEDICINE

## 2020-01-05 PROCEDURE — G0378 HOSPITAL OBSERVATION PER HR: HCPCS

## 2020-01-05 PROCEDURE — 80076 HEPATIC FUNCTION PANEL: CPT

## 2020-01-05 PROCEDURE — 2580000003 HC RX 258: Performed by: FAMILY MEDICINE

## 2020-01-05 PROCEDURE — 36415 COLL VENOUS BLD VENIPUNCTURE: CPT

## 2020-01-05 PROCEDURE — 96366 THER/PROPH/DIAG IV INF ADDON: CPT

## 2020-01-05 PROCEDURE — 6360000002 HC RX W HCPCS: Performed by: FAMILY MEDICINE

## 2020-01-05 PROCEDURE — 6360000002 HC RX W HCPCS: Performed by: SURGERY

## 2020-01-05 PROCEDURE — 85025 COMPLETE CBC W/AUTO DIFF WBC: CPT

## 2020-01-05 PROCEDURE — 99231 SBSQ HOSP IP/OBS SF/LOW 25: CPT | Performed by: FAMILY MEDICINE

## 2020-01-05 RX ADMIN — PIPERACILLIN SODIUM AND TAZOBACTAM SODIUM 3.38 G: 3; .375 INJECTION, POWDER, LYOPHILIZED, FOR SOLUTION INTRAVENOUS at 20:07

## 2020-01-05 RX ADMIN — Medication 10 ML: at 09:40

## 2020-01-05 RX ADMIN — PIPERACILLIN SODIUM AND TAZOBACTAM SODIUM 3.38 G: 3; .375 INJECTION, POWDER, LYOPHILIZED, FOR SOLUTION INTRAVENOUS at 04:21

## 2020-01-05 RX ADMIN — PIPERACILLIN SODIUM AND TAZOBACTAM SODIUM 3.38 G: 3; .375 INJECTION, POWDER, LYOPHILIZED, FOR SOLUTION INTRAVENOUS at 11:23

## 2020-01-05 RX ADMIN — FAMOTIDINE 20 MG: 10 INJECTION, SOLUTION INTRAVENOUS at 17:40

## 2020-01-05 RX ADMIN — FAMOTIDINE 20 MG: 10 INJECTION, SOLUTION INTRAVENOUS at 05:21

## 2020-01-05 RX ADMIN — KETOROLAC TROMETHAMINE 30 MG: 30 INJECTION, SOLUTION INTRAMUSCULAR at 05:21

## 2020-01-05 RX ADMIN — KETOROLAC TROMETHAMINE 30 MG: 30 INJECTION, SOLUTION INTRAMUSCULAR at 23:38

## 2020-01-05 RX ADMIN — KETOROLAC TROMETHAMINE 30 MG: 30 INJECTION, SOLUTION INTRAMUSCULAR at 17:40

## 2020-01-05 RX ADMIN — KETOROLAC TROMETHAMINE 30 MG: 30 INJECTION, SOLUTION INTRAMUSCULAR at 11:23

## 2020-01-05 ASSESSMENT — PAIN SCALES - GENERAL
PAINLEVEL_OUTOF10: 5
PAINLEVEL_OUTOF10: 7
PAINLEVEL_OUTOF10: 3
PAINLEVEL_OUTOF10: 7
PAINLEVEL_OUTOF10: 3

## 2020-01-05 NOTE — PROGRESS NOTES
Patient stated that the Toradol only works for the 3 hours, so she has to deal with the pain for 3 hours waiting for the medication. Writer talked to Dr. Melody Soliman regarding medication. No new orders were received. Patient updated. Warm compress applied.

## 2020-01-05 NOTE — PROGRESS NOTES
Kiara Thomson is a 20 y.o. female patient. Current Facility-Administered Medications   Medication Dose Route Frequency Provider Last Rate Last Dose    famotidine (PEPCID) injection 20 mg  20 mg Intravenous BID Idalia Johnson MD   20 mg at 01/05/20 0521    promethazine (PHENERGAN) 12.5 mg in sodium chloride 0.9 % 50 mL IVPB  12.5 mg Intravenous Q6H PRN Idalia Johnson MD   Stopped at 01/03/20 1850    piperacillin-tazobactam (ZOSYN) 3.375 g in dextrose 5 % 50 mL IVPB extended infusion (mini-bag)  3.375 g Intravenous Q8H Carlos Ivy MD 12.5 mL/hr at 01/05/20 0421 3.375 g at 01/05/20 0421    ketorolac (TORADOL) injection 30 mg  30 mg Intravenous Q6H PRN Cesar Comer MD   30 mg at 01/05/20 0521    0.9 % sodium chloride infusion  1,000 mL Intravenous Continuous Dharmesh Arce MD   Stopped at 01/04/20 1531    sodium chloride flush 0.9 % injection 10 mL  10 mL Intravenous 2 times per day Cesar Comer MD        sodium chloride flush 0.9 % injection 10 mL  10 mL Intravenous PRN Cesar Comer MD        acetaminophen (TYLENOL) tablet 650 mg  650 mg Oral Q4H PRN Cesar Comer MD        enoxaparin (LOVENOX) injection 40 mg  40 mg Subcutaneous Daily Cesar Comer MD         Allergies   Allergen Reactions    Zofran [Ondansetron Hcl] Hives     Principal Problem:    Cholecystitis  Active Problems:    Calculus of gallbladder with acute cholecystitis and obstruction    RUQ pain    Elevated LFTs  Resolved Problems:    * No resolved hospital problems. *    Blood pressure 123/78, pulse 91, temperature 99.1 °F (37.3 °C), temperature source Oral, resp. rate 18, height 5' 1\" (1.549 m), weight 163 lb 5.8 oz (74.1 kg), last menstrual period 12/31/2019, SpO2 95 %, unknown if currently breastfeeding. Subjective:  Symptoms:  (Abdominal discomfort continues. Toradol helps, but not eliminating the pain. ). Activity level: Normal.    Pain:  She complains of pain that is moderate. Objective:  General Appearance:  Comfortable. Vital signs: (most recent): Blood pressure 123/78, pulse 91, temperature 99.1 °F (37.3 °C), temperature source Oral, resp. rate 18, height 5' 1\" (1.549 m), weight 163 lb 5.8 oz (74.1 kg), last menstrual period 12/31/2019, SpO2 95 %, unknown if currently breastfeeding. Vital signs are normal.    Abdomen: There is generalized tenderness. Assessment & Plan  Gallbladder sludge with wall thickening, elevated liver enzymes - transaminases trending down. GI management, surgery consulted as well.       Wendy Gresham MD  1/5/2020

## 2020-01-05 NOTE — CARE COORDINATION
ONGOING DISCHARGE PLAN:    Patient not in her room. Discharge plan from yesterday is to go home with no needs. Per GI notes - Tentatively plan ERCP    Remains on IV Zosyn, Iv fluids    Patient gave birth to a baby boy on 11/15/2020    Labs today - , AST 91, total bilirubin 5.35     Will continue to follow for additional discharge needs.     Electronically signed by Estiven Gibson RN on 1/5/2020 at 4:33 PM
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
## 2020-01-05 NOTE — PLAN OF CARE
Problem: Pain:  Goal: Pain level will decrease  Description  Pain level will decrease  1/5/2020 0416 by Ary Moritz, RN  Outcome: Ongoing  1/4/2020 1843 by Alda Martines RN  Outcome: Ongoing  Note:   Pain is well controlled with current regimen. See MAR.      Problem: Pain:  Goal: Control of acute pain  Description  Control of acute pain  1/5/2020 0416 by Ary Moritz, RN  Outcome: Ongoing  1/4/2020 1843 by Alda Martines RN  Outcome: Ongoing     Problem: Falls - Risk of:  Goal: Absence of physical injury  Description  Absence of physical injury  1/5/2020 0416 by Ary Moritz, RN  Outcome: Ongoing  1/4/2020 1843 by Alda Martines RN  Outcome: Ongoing

## 2020-01-06 ENCOUNTER — APPOINTMENT (OUTPATIENT)
Dept: GENERAL RADIOLOGY | Age: 20
DRG: 418 | End: 2020-01-06
Payer: COMMERCIAL

## 2020-01-06 ENCOUNTER — ANESTHESIA EVENT (OUTPATIENT)
Dept: ENDOSCOPY | Age: 20
DRG: 418 | End: 2020-01-06
Payer: COMMERCIAL

## 2020-01-06 ENCOUNTER — ANESTHESIA (OUTPATIENT)
Dept: ENDOSCOPY | Age: 20
DRG: 418 | End: 2020-01-06
Payer: COMMERCIAL

## 2020-01-06 VITALS
DIASTOLIC BLOOD PRESSURE: 101 MMHG | OXYGEN SATURATION: 91 % | SYSTOLIC BLOOD PRESSURE: 148 MMHG | TEMPERATURE: 98.6 F | RESPIRATION RATE: 2 BRPM

## 2020-01-06 PROBLEM — R74.01 ELEVATED TRANSAMINASE LEVEL: Status: ACTIVE | Noted: 2020-01-06

## 2020-01-06 LAB
ABSOLUTE EOS #: 0.2 K/UL (ref 0–0.4)
ABSOLUTE IMMATURE GRANULOCYTE: ABNORMAL K/UL (ref 0–0.3)
ABSOLUTE LYMPH #: 2 K/UL (ref 1.2–5.2)
ABSOLUTE MONO #: 0.9 K/UL (ref 0.1–1.3)
ALBUMIN SERPL-MCNC: 3.6 G/DL (ref 3.5–5.2)
ALBUMIN/GLOBULIN RATIO: ABNORMAL (ref 1–2.5)
ALP BLD-CCNC: 288 U/L (ref 35–104)
ALT SERPL-CCNC: 190 U/L (ref 5–33)
ANION GAP SERPL CALCULATED.3IONS-SCNC: 12 MMOL/L (ref 9–17)
AST SERPL-CCNC: 67 U/L
BASOPHILS # BLD: 1 % (ref 0–2)
BASOPHILS ABSOLUTE: 0 K/UL (ref 0–0.2)
BILIRUB SERPL-MCNC: 5.15 MG/DL (ref 0.3–1.2)
BILIRUBIN DIRECT: 4.61 MG/DL
BILIRUBIN, INDIRECT: 0.54 MG/DL (ref 0–1)
BUN BLDV-MCNC: 5 MG/DL (ref 6–20)
BUN/CREAT BLD: ABNORMAL (ref 9–20)
CALCIUM SERPL-MCNC: 8.8 MG/DL (ref 8.6–10.4)
CHLORIDE BLD-SCNC: 106 MMOL/L (ref 98–107)
CMV IGM: 0.4
CO2: 24 MMOL/L (ref 20–31)
CREAT SERPL-MCNC: 0.43 MG/DL (ref 0.5–0.9)
DIFFERENTIAL TYPE: ABNORMAL
EOSINOPHILS RELATIVE PERCENT: 4 % (ref 0–4)
GFR AFRICAN AMERICAN: >60 ML/MIN
GFR NON-AFRICAN AMERICAN: >60 ML/MIN
GFR SERPL CREATININE-BSD FRML MDRD: ABNORMAL ML/MIN/{1.73_M2}
GFR SERPL CREATININE-BSD FRML MDRD: ABNORMAL ML/MIN/{1.73_M2}
GLOBULIN: ABNORMAL G/DL (ref 1.5–3.8)
GLUCOSE BLD-MCNC: 101 MG/DL (ref 70–99)
HCT VFR BLD CALC: 40.5 % (ref 36–46)
HEMOGLOBIN: 13.7 G/DL (ref 12–16)
IMMATURE GRANULOCYTES: ABNORMAL %
LYMPHOCYTES # BLD: 34 % (ref 25–45)
MCH RBC QN AUTO: 29.7 PG (ref 26–34)
MCHC RBC AUTO-ENTMCNC: 33.9 G/DL (ref 31–37)
MCV RBC AUTO: 87.6 FL (ref 80–100)
MONOCYTES # BLD: 15 % (ref 2–8)
NRBC AUTOMATED: ABNORMAL PER 100 WBC
PDW BLD-RTO: 13.2 % (ref 11.5–14.9)
PLATELET # BLD: 275 K/UL (ref 150–450)
PLATELET ESTIMATE: ABNORMAL
PMV BLD AUTO: 8.1 FL (ref 6–12)
POTASSIUM SERPL-SCNC: 3.8 MMOL/L (ref 3.7–5.3)
RBC # BLD: 4.62 M/UL (ref 4–5.2)
RBC # BLD: ABNORMAL 10*6/UL
SEG NEUTROPHILS: 46 % (ref 34–64)
SEGMENTED NEUTROPHILS ABSOLUTE COUNT: 2.8 K/UL (ref 1.3–9.1)
SODIUM BLD-SCNC: 142 MMOL/L (ref 135–144)
TOTAL PROTEIN: 7.2 G/DL (ref 6.4–8.3)
WBC # BLD: 5.9 K/UL (ref 4.5–13.5)
WBC # BLD: ABNORMAL 10*3/UL

## 2020-01-06 PROCEDURE — 80076 HEPATIC FUNCTION PANEL: CPT

## 2020-01-06 PROCEDURE — 6360000002 HC RX W HCPCS: Performed by: FAMILY MEDICINE

## 2020-01-06 PROCEDURE — 2580000003 HC RX 258: Performed by: INTERNAL MEDICINE

## 2020-01-06 PROCEDURE — 96366 THER/PROPH/DIAG IV INF ADDON: CPT

## 2020-01-06 PROCEDURE — 2500000003 HC RX 250 WO HCPCS: Performed by: SURGERY

## 2020-01-06 PROCEDURE — C1769 GUIDE WIRE: HCPCS | Performed by: INTERNAL MEDICINE

## 2020-01-06 PROCEDURE — 2500000003 HC RX 250 WO HCPCS: Performed by: INTERNAL MEDICINE

## 2020-01-06 PROCEDURE — 2580000003 HC RX 258: Performed by: ANESTHESIOLOGY

## 2020-01-06 PROCEDURE — 6360000004 HC RX CONTRAST MEDICATION: Performed by: INTERNAL MEDICINE

## 2020-01-06 PROCEDURE — 6360000002 HC RX W HCPCS: Performed by: INTERNAL MEDICINE

## 2020-01-06 PROCEDURE — 2709999900 HC NON-CHARGEABLE SUPPLY: Performed by: INTERNAL MEDICINE

## 2020-01-06 PROCEDURE — 2500000003 HC RX 250 WO HCPCS: Performed by: NURSE ANESTHETIST, CERTIFIED REGISTERED

## 2020-01-06 PROCEDURE — 7100000000 HC PACU RECOVERY - FIRST 15 MIN: Performed by: INTERNAL MEDICINE

## 2020-01-06 PROCEDURE — 3700000000 HC ANESTHESIA ATTENDED CARE: Performed by: INTERNAL MEDICINE

## 2020-01-06 PROCEDURE — 43264 ERCP REMOVE DUCT CALCULI: CPT | Performed by: INTERNAL MEDICINE

## 2020-01-06 PROCEDURE — 43262 ENDO CHOLANGIOPANCREATOGRAPH: CPT | Performed by: INTERNAL MEDICINE

## 2020-01-06 PROCEDURE — 85025 COMPLETE CBC W/AUTO DIFF WBC: CPT

## 2020-01-06 PROCEDURE — 3609014900 HC ERCP W/SPHINCTEROTOMY &/OR PAPILLOTOMY: Performed by: INTERNAL MEDICINE

## 2020-01-06 PROCEDURE — 1200000000 HC SEMI PRIVATE

## 2020-01-06 PROCEDURE — 6360000002 HC RX W HCPCS: Performed by: NURSE ANESTHETIST, CERTIFIED REGISTERED

## 2020-01-06 PROCEDURE — 2720000010 HC SURG SUPPLY STERILE: Performed by: INTERNAL MEDICINE

## 2020-01-06 PROCEDURE — 6360000002 HC RX W HCPCS: Performed by: SURGERY

## 2020-01-06 PROCEDURE — 74330 X-RAY BILE/PANC ENDOSCOPY: CPT

## 2020-01-06 PROCEDURE — 6360000002 HC RX W HCPCS: Performed by: ANESTHESIOLOGY

## 2020-01-06 PROCEDURE — 36415 COLL VENOUS BLD VENIPUNCTURE: CPT

## 2020-01-06 PROCEDURE — 2580000003 HC RX 258: Performed by: SURGERY

## 2020-01-06 PROCEDURE — 0FC98ZZ EXTIRPATION OF MATTER FROM COMMON BILE DUCT, VIA NATURAL OR ARTIFICIAL OPENING ENDOSCOPIC: ICD-10-PCS | Performed by: INTERNAL MEDICINE

## 2020-01-06 PROCEDURE — 7100000001 HC PACU RECOVERY - ADDTL 15 MIN: Performed by: INTERNAL MEDICINE

## 2020-01-06 PROCEDURE — 3700000001 HC ADD 15 MINUTES (ANESTHESIA): Performed by: INTERNAL MEDICINE

## 2020-01-06 PROCEDURE — 80048 BASIC METABOLIC PNL TOTAL CA: CPT

## 2020-01-06 RX ORDER — DIPHENHYDRAMINE HYDROCHLORIDE 50 MG/ML
12.5 INJECTION INTRAMUSCULAR; INTRAVENOUS
Status: DISCONTINUED | OUTPATIENT
Start: 2020-01-06 | End: 2020-01-06

## 2020-01-06 RX ORDER — ONDANSETRON 2 MG/ML
4 INJECTION INTRAMUSCULAR; INTRAVENOUS
Status: DISCONTINUED | OUTPATIENT
Start: 2020-01-06 | End: 2020-01-06

## 2020-01-06 RX ORDER — ROCURONIUM BROMIDE 10 MG/ML
INJECTION, SOLUTION INTRAVENOUS PRN
Status: DISCONTINUED | OUTPATIENT
Start: 2020-01-06 | End: 2020-01-06 | Stop reason: SDUPTHER

## 2020-01-06 RX ORDER — OXYCODONE HYDROCHLORIDE AND ACETAMINOPHEN 5; 325 MG/1; MG/1
2 TABLET ORAL PRN
Status: DISCONTINUED | OUTPATIENT
Start: 2020-01-06 | End: 2020-01-06

## 2020-01-06 RX ORDER — OXYCODONE HYDROCHLORIDE AND ACETAMINOPHEN 5; 325 MG/1; MG/1
1 TABLET ORAL PRN
Status: DISCONTINUED | OUTPATIENT
Start: 2020-01-06 | End: 2020-01-06

## 2020-01-06 RX ORDER — LIDOCAINE HYDROCHLORIDE 10 MG/ML
INJECTION, SOLUTION EPIDURAL; INFILTRATION; INTRACAUDAL; PERINEURAL PRN
Status: DISCONTINUED | OUTPATIENT
Start: 2020-01-06 | End: 2020-01-06 | Stop reason: SDUPTHER

## 2020-01-06 RX ORDER — LABETALOL 20 MG/4 ML (5 MG/ML) INTRAVENOUS SYRINGE
5 EVERY 10 MIN PRN
Status: DISCONTINUED | OUTPATIENT
Start: 2020-01-06 | End: 2020-01-06

## 2020-01-06 RX ORDER — SODIUM CHLORIDE 9 MG/ML
INJECTION, SOLUTION INTRAVENOUS CONTINUOUS
Status: DISCONTINUED | OUTPATIENT
Start: 2020-01-06 | End: 2020-01-06

## 2020-01-06 RX ORDER — FENTANYL CITRATE 50 UG/ML
INJECTION, SOLUTION INTRAMUSCULAR; INTRAVENOUS PRN
Status: DISCONTINUED | OUTPATIENT
Start: 2020-01-06 | End: 2020-01-06 | Stop reason: SDUPTHER

## 2020-01-06 RX ORDER — METOCLOPRAMIDE HYDROCHLORIDE 5 MG/ML
INJECTION INTRAMUSCULAR; INTRAVENOUS PRN
Status: DISCONTINUED | OUTPATIENT
Start: 2020-01-06 | End: 2020-01-06 | Stop reason: SDUPTHER

## 2020-01-06 RX ORDER — PROPOFOL 10 MG/ML
INJECTION, EMULSION INTRAVENOUS PRN
Status: DISCONTINUED | OUTPATIENT
Start: 2020-01-06 | End: 2020-01-06 | Stop reason: SDUPTHER

## 2020-01-06 RX ORDER — MIDAZOLAM HYDROCHLORIDE 1 MG/ML
1 INJECTION INTRAMUSCULAR; INTRAVENOUS
Status: COMPLETED | OUTPATIENT
Start: 2020-01-06 | End: 2020-01-06

## 2020-01-06 RX ORDER — MIDAZOLAM HYDROCHLORIDE 1 MG/ML
INJECTION INTRAMUSCULAR; INTRAVENOUS PRN
Status: DISCONTINUED | OUTPATIENT
Start: 2020-01-06 | End: 2020-01-06 | Stop reason: SDUPTHER

## 2020-01-06 RX ADMIN — FAMOTIDINE 20 MG: 10 INJECTION, SOLUTION INTRAVENOUS at 07:48

## 2020-01-06 RX ADMIN — FENTANYL CITRATE 50 MCG: 50 INJECTION, SOLUTION INTRAMUSCULAR; INTRAVENOUS at 12:38

## 2020-01-06 RX ADMIN — PROPOFOL 200 MG: 10 INJECTION, EMULSION INTRAVENOUS at 12:18

## 2020-01-06 RX ADMIN — Medication 10 ML: at 20:07

## 2020-01-06 RX ADMIN — FAMOTIDINE 20 MG: 10 INJECTION, SOLUTION INTRAVENOUS at 18:32

## 2020-01-06 RX ADMIN — ROCURONIUM BROMIDE 30 MG: 10 INJECTION, SOLUTION INTRAVENOUS at 12:18

## 2020-01-06 RX ADMIN — SUGAMMADEX 148 MG: 100 INJECTION, SOLUTION INTRAVENOUS at 12:39

## 2020-01-06 RX ADMIN — METOCLOPRAMIDE 5 MG: 5 INJECTION, SOLUTION INTRAMUSCULAR; INTRAVENOUS at 12:47

## 2020-01-06 RX ADMIN — KETOROLAC TROMETHAMINE 30 MG: 30 INJECTION, SOLUTION INTRAMUSCULAR at 07:48

## 2020-01-06 RX ADMIN — MIDAZOLAM 2 MG: 1 INJECTION INTRAMUSCULAR; INTRAVENOUS at 12:14

## 2020-01-06 RX ADMIN — FENTANYL CITRATE 50 MCG: 50 INJECTION, SOLUTION INTRAMUSCULAR; INTRAVENOUS at 12:18

## 2020-01-06 RX ADMIN — LIDOCAINE HYDROCHLORIDE 50 MG: 10 INJECTION, SOLUTION EPIDURAL; INFILTRATION; INTRACAUDAL at 12:18

## 2020-01-06 RX ADMIN — SODIUM CHLORIDE: 9 INJECTION, SOLUTION INTRAVENOUS at 11:33

## 2020-01-06 RX ADMIN — MIDAZOLAM HYDROCHLORIDE 1 MG: 2 INJECTION, SOLUTION INTRAMUSCULAR; INTRAVENOUS at 11:48

## 2020-01-06 RX ADMIN — PIPERACILLIN SODIUM AND TAZOBACTAM SODIUM 3.38 G: 3; .375 INJECTION, POWDER, LYOPHILIZED, FOR SOLUTION INTRAVENOUS at 20:04

## 2020-01-06 RX ADMIN — PIPERACILLIN SODIUM AND TAZOBACTAM SODIUM 3.38 G: 3; .375 INJECTION, POWDER, LYOPHILIZED, FOR SOLUTION INTRAVENOUS at 12:14

## 2020-01-06 RX ADMIN — PIPERACILLIN SODIUM AND TAZOBACTAM SODIUM 3.38 G: 3; .375 INJECTION, POWDER, LYOPHILIZED, FOR SOLUTION INTRAVENOUS at 03:03

## 2020-01-06 ASSESSMENT — PAIN SCALES - GENERAL
PAINLEVEL_OUTOF10: 0
PAINLEVEL_OUTOF10: 2
PAINLEVEL_OUTOF10: 0
PAINLEVEL_OUTOF10: 4
PAINLEVEL_OUTOF10: 0

## 2020-01-06 ASSESSMENT — LIFESTYLE VARIABLES: SMOKING_STATUS: 1

## 2020-01-06 ASSESSMENT — PULMONARY FUNCTION TESTS
PIF_VALUE: 27
PIF_VALUE: 3
PIF_VALUE: 13
PIF_VALUE: 30
PIF_VALUE: 29
PIF_VALUE: 1
PIF_VALUE: 30
PIF_VALUE: 29
PIF_VALUE: 21
PIF_VALUE: 17
PIF_VALUE: 21
PIF_VALUE: 20
PIF_VALUE: 6
PIF_VALUE: 2
PIF_VALUE: 17
PIF_VALUE: 24
PIF_VALUE: 23
PIF_VALUE: 0
PIF_VALUE: 29
PIF_VALUE: 29
PIF_VALUE: 22
PIF_VALUE: 30
PIF_VALUE: 30
PIF_VALUE: 25
PIF_VALUE: 29
PIF_VALUE: 19
PIF_VALUE: 2
PIF_VALUE: 12
PIF_VALUE: 29
PIF_VALUE: 2
PIF_VALUE: 30
PIF_VALUE: 26
PIF_VALUE: 29
PIF_VALUE: 19
PIF_VALUE: 30
PIF_VALUE: 2
PIF_VALUE: 2
PIF_VALUE: 22
PIF_VALUE: 30
PIF_VALUE: 3
PIF_VALUE: 17
PIF_VALUE: 3
PIF_VALUE: 24

## 2020-01-06 ASSESSMENT — PAIN DESCRIPTION - PAIN TYPE: TYPE: ACUTE PAIN

## 2020-01-06 ASSESSMENT — PAIN DESCRIPTION - LOCATION: LOCATION: ABDOMEN

## 2020-01-06 NOTE — CARE COORDINATION
Kunal Leger DISCHARGE PLANNING NOTE:      Discharge plan is home with no needs. Patient had ERCP today with balloon sweeps and stone extraction. Current orders include IV zosyn and full liquid diet. Will follow for discharge needs.

## 2020-01-06 NOTE — ANESTHESIA POSTPROCEDURE EVALUATION
Department of Anesthesiology  Postprocedure Note    Patient: Nghia Finney  MRN: [id number]  YOB: 2000  Date of evaluation: 1/6/2020  Time:  1:59 PM     Procedure Summary     Date:  01/06/20 Room / Location:  [facility] ENDO 03 / [facility]    Anesthesia Start:  1214 Anesthesia Stop:  1300    Procedure:  ERCP ENDOSCOPIC RETROGRADE CHOLANGIOPANCREATOGRAPHY WITH PAPILLOTOMY WITH BALLOON SWEEPS AND STONE EXTRACTION (N/A ) Diagnosis:  (CHOLECYSTITIS)    Surgeon:  Charlotte Abel MD Responsible Provider:  Reyes Funes MD    Anesthesia Type:  general ASA Status:  2          Anesthesia Type: general    Gabbie Phase I: Gabbie Score: 10    Gabbie Phase II:      Last vitals: Reviewed and per EMR flowsheets.        Anesthesia Post Evaluation    Comments: POST- ANESTHESIA EVALUATION       Pt Name: Nghia Finney  MRN: [id number]  YOB: 2000  Date of evaluation: 1/6/2020  Time:  1:59 PM      /77   Pulse 90   Temp 97.6 °F (36.4 °C)   Resp 18   Ht 5' 1\" (1.549 m)   Wt 163 lb 5.8 oz (74.1 kg)   LMP 12/31/2019   SpO2 100%   BMI 30.87 kg/m²      Consciousness Level  Awake  Cardiopulmonary Status  Stable  Pain Adequately Treated YES  Nausea / Vomiting  NO  Adequate Hydration  YES  Anesthesia Related Complications NONE      Electronically signed by Pete Ivy MD on 1/6/2020 at 1:59 PM

## 2020-01-06 NOTE — ANESTHESIA PRE PROCEDURE
Department of Anesthesiology  Preprocedure Note       Name:  Polo Neal   Age:  20 y.o.  :  2000                                          MRN:  [id number]         Date:  2020      Surgeon: Bryan Degroot):  Maria Dolores Castro MD    Procedure: ERCP ENDOSCOPIC RETROGRADE CHOLANGIOPANCREATOGRAPHY (N/A )    Medications prior to admission:   Prior to Admission medications    Medication Sig Start Date End Date Taking? Authorizing Provider   FLUoxetine (PROZAC) 20 MG capsule Take 1 capsule by mouth daily 19   Charis Root MD   ibuprofen (ADVIL;MOTRIN) 800 MG tablet Take 1 tablet by mouth every 8 hours as needed for Pain 11/15/19   Aminah Loomis DO   Prenatal Vit-Fe Fumarate-FA (PNV PRENATAL PLUS MULTIVITAMIN) 27-1 MG TABS Take 1 tablet by mouth daily 3/13/19 3/7/20  JARED Poon - CNP       Current medications:    No current facility-administered medications for this visit. No current outpatient medications on file.      Facility-Administered Medications Ordered in Other Visits   Medication Dose Route Frequency Provider Last Rate Last Dose    famotidine (PEPCID) injection 20 mg  20 mg Intravenous BID Trinh Martin MD   20 mg at 20 0748    promethazine (PHENERGAN) 12.5 mg in sodium chloride 0.9 % 50 mL IVPB  12.5 mg Intravenous Q6H PRN Trinh Martin MD   Stopped at 20 1850    piperacillin-tazobactam (ZOSYN) 3.375 g in dextrose 5 % 50 mL IVPB extended infusion (mini-bag)  3.375 g Intravenous Kathleen Gillis MD   Stopped at 20 0703    ketorolac (TORADOL) injection 30 mg  30 mg Intravenous Q6H PRN Adam Melo MD   30 mg at 20 0748    sodium chloride flush 0.9 % injection 10 mL  10 mL Intravenous 2 times per day Adam Melo MD   10 mL at 20 0940    sodium chloride flush 0.9 % injection 10 mL  10 mL Intravenous PRN Adam Melo MD        acetaminophen (TYLENOL) tablet 650 mg  650 mg Oral Q4H PRN MD Wyatt Bland

## 2020-01-06 NOTE — ANESTHESIA POSTPROCEDURE EVALUATION
Department of Anesthesiology  Postprocedure Note    Patient: Noemy Tamayo  MRN: [id number]  YOB: 2000  Date of evaluation: 1/6/2020  Time:  1:59 PM     Procedure Summary     Date:  01/06/20 Room / Location:  [address] [place] [address] ENDO 03 / [facility] ENDO    Anesthesia Start:  1214 Anesthesia Stop:  1300    Procedure:  ERCP ENDOSCOPIC RETROGRADE CHOLANGIOPANCREATOGRAPHY WITH PAPILLOTOMY WITH BALLOON SWEEPS AND STONE EXTRACTION (N/A ) Diagnosis:  (CHOLECYSTITIS)    Surgeon:  Simeon Fernandez MD Responsible Provider:  Cb Montenegro MD    Anesthesia Type:  general ASA Status:  2          Anesthesia Type: general    Gabbie Phase I: Gabbie Score: 10    Gabbie Phase II:      Last vitals: Reviewed and per EMR flowsheets.        Anesthesia Post Evaluation    Comments: POST- ANESTHESIA EVALUATION       Pt Name: Noemy Tamayo  MRN: [id number]  YOB: 2000  Date of evaluation: 1/6/2020  Time:  1:59 PM      /77   Pulse 90   Temp 97.6 °F (36.4 °C)   Resp 18   Ht 5' 1\" (1.549 m)   Wt 163 lb 5.8 oz (74.1 kg)   LMP 12/31/2019   SpO2 100%   BMI 30.87 kg/m²      Consciousness Level  Awake  Cardiopulmonary Status  Stable  Pain Adequately Treated YES  Nausea / Vomiting  NO  Adequate Hydration  YES  Anesthesia Related Complications NONE      Electronically signed by Cb Montenegro MD on 1/6/2020 at 1:59 PM

## 2020-01-07 ENCOUNTER — ANESTHESIA EVENT (OUTPATIENT)
Dept: OPERATING ROOM | Age: 20
DRG: 418 | End: 2020-01-07
Payer: COMMERCIAL

## 2020-01-07 ENCOUNTER — ANESTHESIA (OUTPATIENT)
Dept: OPERATING ROOM | Age: 20
DRG: 418 | End: 2020-01-07
Payer: COMMERCIAL

## 2020-01-07 VITALS — DIASTOLIC BLOOD PRESSURE: 94 MMHG | SYSTOLIC BLOOD PRESSURE: 138 MMHG | TEMPERATURE: 98.1 F | OXYGEN SATURATION: 100 %

## 2020-01-07 PROBLEM — E44.1 MILD MALNUTRITION (HCC): Status: ACTIVE | Noted: 2020-01-07

## 2020-01-07 LAB
ABSOLUTE EOS #: 0.2 K/UL (ref 0–0.4)
ABSOLUTE IMMATURE GRANULOCYTE: ABNORMAL K/UL (ref 0–0.3)
ABSOLUTE LYMPH #: 2.1 K/UL (ref 1.2–5.2)
ABSOLUTE MONO #: 0.7 K/UL (ref 0.1–1.3)
ALBUMIN SERPL-MCNC: 3.5 G/DL (ref 3.5–5.2)
ALBUMIN/GLOBULIN RATIO: ABNORMAL (ref 1–2.5)
ALP BLD-CCNC: 261 U/L (ref 35–104)
ALT SERPL-CCNC: 161 U/L (ref 5–33)
ANION GAP SERPL CALCULATED.3IONS-SCNC: 11 MMOL/L (ref 9–17)
AST SERPL-CCNC: 67 U/L
BASOPHILS # BLD: 1 % (ref 0–2)
BASOPHILS ABSOLUTE: 0 K/UL (ref 0–0.2)
BILIRUB SERPL-MCNC: 3.33 MG/DL (ref 0.3–1.2)
BILIRUBIN DIRECT: 2.85 MG/DL
BILIRUBIN, INDIRECT: 0.48 MG/DL (ref 0–1)
BUN BLDV-MCNC: 4 MG/DL (ref 6–20)
BUN/CREAT BLD: ABNORMAL (ref 9–20)
CALCIUM SERPL-MCNC: 8.9 MG/DL (ref 8.6–10.4)
CHLORIDE BLD-SCNC: 106 MMOL/L (ref 98–107)
CO2: 24 MMOL/L (ref 20–31)
CREAT SERPL-MCNC: 0.5 MG/DL (ref 0.5–0.9)
DIFFERENTIAL TYPE: ABNORMAL
EOSINOPHILS RELATIVE PERCENT: 3 % (ref 0–4)
GFR AFRICAN AMERICAN: >60 ML/MIN
GFR NON-AFRICAN AMERICAN: >60 ML/MIN
GFR SERPL CREATININE-BSD FRML MDRD: ABNORMAL ML/MIN/{1.73_M2}
GFR SERPL CREATININE-BSD FRML MDRD: ABNORMAL ML/MIN/{1.73_M2}
GLOBULIN: ABNORMAL G/DL (ref 1.5–3.8)
GLUCOSE BLD-MCNC: 96 MG/DL (ref 70–99)
HCT VFR BLD CALC: 38.4 % (ref 36–46)
HEMOGLOBIN: 13.1 G/DL (ref 12–16)
IMMATURE GRANULOCYTES: ABNORMAL %
LYMPHOCYTES # BLD: 35 % (ref 25–45)
MCH RBC QN AUTO: 29.7 PG (ref 26–34)
MCHC RBC AUTO-ENTMCNC: 34.1 G/DL (ref 31–37)
MCV RBC AUTO: 87 FL (ref 80–100)
MONOCYTES # BLD: 12 % (ref 2–8)
NRBC AUTOMATED: ABNORMAL PER 100 WBC
PDW BLD-RTO: 13.3 % (ref 11.5–14.9)
PLATELET # BLD: 279 K/UL (ref 150–450)
PLATELET ESTIMATE: ABNORMAL
PMV BLD AUTO: 7.7 FL (ref 6–12)
POTASSIUM SERPL-SCNC: 4.3 MMOL/L (ref 3.7–5.3)
RBC # BLD: 4.42 M/UL (ref 4–5.2)
RBC # BLD: ABNORMAL 10*6/UL
SEG NEUTROPHILS: 49 % (ref 34–64)
SEGMENTED NEUTROPHILS ABSOLUTE COUNT: 2.9 K/UL (ref 1.3–9.1)
SODIUM BLD-SCNC: 141 MMOL/L (ref 135–144)
TOTAL PROTEIN: 7 G/DL (ref 6.4–8.3)
WBC # BLD: 5.9 K/UL (ref 4.5–13.5)
WBC # BLD: ABNORMAL 10*3/UL

## 2020-01-07 PROCEDURE — 2500000003 HC RX 250 WO HCPCS: Performed by: INTERNAL MEDICINE

## 2020-01-07 PROCEDURE — 99232 SBSQ HOSP IP/OBS MODERATE 35: CPT | Performed by: INTERNAL MEDICINE

## 2020-01-07 PROCEDURE — 0FT44ZZ RESECTION OF GALLBLADDER, PERCUTANEOUS ENDOSCOPIC APPROACH: ICD-10-PCS | Performed by: SURGERY

## 2020-01-07 PROCEDURE — 8E0W4CZ ROBOTIC ASSISTED PROCEDURE OF TRUNK REGION, PERCUTANEOUS ENDOSCOPIC APPROACH: ICD-10-PCS | Performed by: SURGERY

## 2020-01-07 PROCEDURE — 3700000001 HC ADD 15 MINUTES (ANESTHESIA): Performed by: SURGERY

## 2020-01-07 PROCEDURE — 2500000003 HC RX 250 WO HCPCS: Performed by: SURGERY

## 2020-01-07 PROCEDURE — 3700000000 HC ANESTHESIA ATTENDED CARE: Performed by: SURGERY

## 2020-01-07 PROCEDURE — 7100000001 HC PACU RECOVERY - ADDTL 15 MIN: Performed by: SURGERY

## 2020-01-07 PROCEDURE — 6370000000 HC RX 637 (ALT 250 FOR IP): Performed by: SURGERY

## 2020-01-07 PROCEDURE — 36415 COLL VENOUS BLD VENIPUNCTURE: CPT

## 2020-01-07 PROCEDURE — 2580000003 HC RX 258: Performed by: ANESTHESIOLOGY

## 2020-01-07 PROCEDURE — 6360000002 HC RX W HCPCS

## 2020-01-07 PROCEDURE — 2580000003 HC RX 258: Performed by: SURGERY

## 2020-01-07 PROCEDURE — 1200000000 HC SEMI PRIVATE

## 2020-01-07 PROCEDURE — 6360000002 HC RX W HCPCS: Performed by: NURSE ANESTHETIST, CERTIFIED REGISTERED

## 2020-01-07 PROCEDURE — 6360000002 HC RX W HCPCS: Performed by: INTERNAL MEDICINE

## 2020-01-07 PROCEDURE — S2900 ROBOTIC SURGICAL SYSTEM: HCPCS | Performed by: SURGERY

## 2020-01-07 PROCEDURE — 3600000009 HC SURGERY ROBOT BASE: Performed by: SURGERY

## 2020-01-07 PROCEDURE — 3600000019 HC SURGERY ROBOT ADDTL 15MIN: Performed by: SURGERY

## 2020-01-07 PROCEDURE — 2500000003 HC RX 250 WO HCPCS

## 2020-01-07 PROCEDURE — 2709999900 HC NON-CHARGEABLE SUPPLY: Performed by: SURGERY

## 2020-01-07 PROCEDURE — 80076 HEPATIC FUNCTION PANEL: CPT

## 2020-01-07 PROCEDURE — 80048 BASIC METABOLIC PNL TOTAL CA: CPT

## 2020-01-07 PROCEDURE — 6360000002 HC RX W HCPCS: Performed by: SURGERY

## 2020-01-07 PROCEDURE — 7100000000 HC PACU RECOVERY - FIRST 15 MIN: Performed by: SURGERY

## 2020-01-07 PROCEDURE — 85025 COMPLETE CBC W/AUTO DIFF WBC: CPT

## 2020-01-07 PROCEDURE — 6360000002 HC RX W HCPCS: Performed by: ANESTHESIOLOGY

## 2020-01-07 PROCEDURE — 2580000003 HC RX 258: Performed by: INTERNAL MEDICINE

## 2020-01-07 PROCEDURE — 2500000003 HC RX 250 WO HCPCS: Performed by: NURSE ANESTHETIST, CERTIFIED REGISTERED

## 2020-01-07 PROCEDURE — 88304 TISSUE EXAM BY PATHOLOGIST: CPT

## 2020-01-07 RX ORDER — DIPHENHYDRAMINE HYDROCHLORIDE 50 MG/ML
12.5 INJECTION INTRAMUSCULAR; INTRAVENOUS
Status: DISCONTINUED | OUTPATIENT
Start: 2020-01-07 | End: 2020-01-07 | Stop reason: HOSPADM

## 2020-01-07 RX ORDER — ONDANSETRON 2 MG/ML
4 INJECTION INTRAMUSCULAR; INTRAVENOUS
Status: DISCONTINUED | OUTPATIENT
Start: 2020-01-07 | End: 2020-01-07

## 2020-01-07 RX ORDER — OXYCODONE HYDROCHLORIDE AND ACETAMINOPHEN 5; 325 MG/1; MG/1
2 TABLET ORAL PRN
Status: DISCONTINUED | OUTPATIENT
Start: 2020-01-07 | End: 2020-01-07 | Stop reason: HOSPADM

## 2020-01-07 RX ORDER — OXYCODONE HYDROCHLORIDE AND ACETAMINOPHEN 5; 325 MG/1; MG/1
1 TABLET ORAL EVERY 6 HOURS PRN
Qty: 28 TABLET | Refills: 0 | Status: SHIPPED | OUTPATIENT
Start: 2020-01-07 | End: 2020-01-14

## 2020-01-07 RX ORDER — DEXAMETHASONE SODIUM PHOSPHATE 4 MG/ML
INJECTION, SOLUTION INTRA-ARTICULAR; INTRALESIONAL; INTRAMUSCULAR; INTRAVENOUS; SOFT TISSUE PRN
Status: DISCONTINUED | OUTPATIENT
Start: 2020-01-07 | End: 2020-01-07 | Stop reason: SDUPTHER

## 2020-01-07 RX ORDER — OXYCODONE HYDROCHLORIDE AND ACETAMINOPHEN 5; 325 MG/1; MG/1
1 TABLET ORAL EVERY 4 HOURS PRN
Status: DISCONTINUED | OUTPATIENT
Start: 2020-01-07 | End: 2020-01-08 | Stop reason: HOSPADM

## 2020-01-07 RX ORDER — DIPHENHYDRAMINE HYDROCHLORIDE 50 MG/ML
INJECTION INTRAMUSCULAR; INTRAVENOUS PRN
Status: DISCONTINUED | OUTPATIENT
Start: 2020-01-07 | End: 2020-01-07 | Stop reason: SDUPTHER

## 2020-01-07 RX ORDER — PROPOFOL 10 MG/ML
INJECTION, EMULSION INTRAVENOUS PRN
Status: DISCONTINUED | OUTPATIENT
Start: 2020-01-07 | End: 2020-01-07 | Stop reason: SDUPTHER

## 2020-01-07 RX ORDER — MIDAZOLAM HYDROCHLORIDE 1 MG/ML
2 INJECTION INTRAMUSCULAR; INTRAVENOUS
Status: DISPENSED | OUTPATIENT
Start: 2020-01-07 | End: 2020-01-07

## 2020-01-07 RX ORDER — BUPIVACAINE HYDROCHLORIDE 5 MG/ML
INJECTION, SOLUTION EPIDURAL; INTRACAUDAL PRN
Status: DISCONTINUED | OUTPATIENT
Start: 2020-01-07 | End: 2020-01-07 | Stop reason: HOSPADM

## 2020-01-07 RX ORDER — OXYCODONE HYDROCHLORIDE AND ACETAMINOPHEN 5; 325 MG/1; MG/1
1 TABLET ORAL PRN
Status: DISCONTINUED | OUTPATIENT
Start: 2020-01-07 | End: 2020-01-07 | Stop reason: HOSPADM

## 2020-01-07 RX ORDER — CEPHALEXIN 500 MG/1
CAPSULE ORAL
Qty: 21 CAPSULE | Refills: 0 | Status: SHIPPED | OUTPATIENT
Start: 2020-01-07 | End: 2020-05-05 | Stop reason: ALTCHOICE

## 2020-01-07 RX ORDER — CEFAZOLIN SODIUM 1 G/3ML
INJECTION, POWDER, FOR SOLUTION INTRAMUSCULAR; INTRAVENOUS PRN
Status: DISCONTINUED | OUTPATIENT
Start: 2020-01-07 | End: 2020-01-07 | Stop reason: SDUPTHER

## 2020-01-07 RX ORDER — LIDOCAINE HYDROCHLORIDE 10 MG/ML
INJECTION, SOLUTION EPIDURAL; INFILTRATION; INTRACAUDAL; PERINEURAL PRN
Status: DISCONTINUED | OUTPATIENT
Start: 2020-01-07 | End: 2020-01-07 | Stop reason: SDUPTHER

## 2020-01-07 RX ORDER — NEOSTIGMINE METHYLSULFATE 5 MG/5 ML
SYRINGE (ML) INTRAVENOUS PRN
Status: DISCONTINUED | OUTPATIENT
Start: 2020-01-07 | End: 2020-01-07 | Stop reason: SDUPTHER

## 2020-01-07 RX ORDER — LABETALOL 20 MG/4 ML (5 MG/ML) INTRAVENOUS SYRINGE
5 EVERY 10 MIN PRN
Status: DISCONTINUED | OUTPATIENT
Start: 2020-01-07 | End: 2020-01-07 | Stop reason: HOSPADM

## 2020-01-07 RX ORDER — FENTANYL CITRATE 50 UG/ML
50 INJECTION, SOLUTION INTRAMUSCULAR; INTRAVENOUS
Status: DISCONTINUED | OUTPATIENT
Start: 2020-01-07 | End: 2020-01-08 | Stop reason: HOSPADM

## 2020-01-07 RX ORDER — ROCURONIUM BROMIDE 10 MG/ML
INJECTION, SOLUTION INTRAVENOUS PRN
Status: DISCONTINUED | OUTPATIENT
Start: 2020-01-07 | End: 2020-01-07 | Stop reason: SDUPTHER

## 2020-01-07 RX ORDER — FENTANYL CITRATE 50 UG/ML
INJECTION, SOLUTION INTRAMUSCULAR; INTRAVENOUS PRN
Status: DISCONTINUED | OUTPATIENT
Start: 2020-01-07 | End: 2020-01-07 | Stop reason: SDUPTHER

## 2020-01-07 RX ORDER — GLYCOPYRROLATE 1 MG/5 ML
SYRINGE (ML) INTRAVENOUS PRN
Status: DISCONTINUED | OUTPATIENT
Start: 2020-01-07 | End: 2020-01-07 | Stop reason: SDUPTHER

## 2020-01-07 RX ORDER — MIDAZOLAM HYDROCHLORIDE 1 MG/ML
INJECTION INTRAMUSCULAR; INTRAVENOUS PRN
Status: DISCONTINUED | OUTPATIENT
Start: 2020-01-07 | End: 2020-01-07 | Stop reason: SDUPTHER

## 2020-01-07 RX ORDER — METOPROLOL TARTRATE 5 MG/5ML
INJECTION INTRAVENOUS PRN
Status: DISCONTINUED | OUTPATIENT
Start: 2020-01-07 | End: 2020-01-07 | Stop reason: SDUPTHER

## 2020-01-07 RX ORDER — PROCHLORPERAZINE EDISYLATE 5 MG/ML
5 INJECTION INTRAMUSCULAR; INTRAVENOUS ONCE
Status: COMPLETED | OUTPATIENT
Start: 2020-01-07 | End: 2020-01-07

## 2020-01-07 RX ORDER — SODIUM CHLORIDE, SODIUM LACTATE, POTASSIUM CHLORIDE, CALCIUM CHLORIDE 600; 310; 30; 20 MG/100ML; MG/100ML; MG/100ML; MG/100ML
INJECTION, SOLUTION INTRAVENOUS CONTINUOUS
Status: DISCONTINUED | OUTPATIENT
Start: 2020-01-07 | End: 2020-01-07

## 2020-01-07 RX ADMIN — DIPHENHYDRAMINE HYDROCHLORIDE 12.5 MG: 50 INJECTION, SOLUTION INTRAMUSCULAR; INTRAVENOUS at 15:20

## 2020-01-07 RX ADMIN — METOROPROLOL TARTRATE 2 MG: 5 INJECTION, SOLUTION INTRAVENOUS at 16:08

## 2020-01-07 RX ADMIN — FENTANYL CITRATE 50 MCG: 50 INJECTION, SOLUTION INTRAMUSCULAR; INTRAVENOUS at 15:54

## 2020-01-07 RX ADMIN — FENTANYL CITRATE 100 MCG: 50 INJECTION, SOLUTION INTRAMUSCULAR; INTRAVENOUS at 15:59

## 2020-01-07 RX ADMIN — SODIUM CHLORIDE, POTASSIUM CHLORIDE, SODIUM LACTATE AND CALCIUM CHLORIDE: 600; 310; 30; 20 INJECTION, SOLUTION INTRAVENOUS at 14:40

## 2020-01-07 RX ADMIN — HYDROMORPHONE HYDROCHLORIDE 0.5 MG: 1 INJECTION, SOLUTION INTRAMUSCULAR; INTRAVENOUS; SUBCUTANEOUS at 17:13

## 2020-01-07 RX ADMIN — FENTANYL CITRATE 50 MCG: 50 INJECTION, SOLUTION INTRAMUSCULAR; INTRAVENOUS at 18:46

## 2020-01-07 RX ADMIN — PROMETHAZINE HYDROCHLORIDE 12.5 MG: 25 INJECTION INTRAMUSCULAR; INTRAVENOUS at 19:33

## 2020-01-07 RX ADMIN — PIPERACILLIN SODIUM AND TAZOBACTAM SODIUM 3.38 G: 3; .375 INJECTION, POWDER, LYOPHILIZED, FOR SOLUTION INTRAVENOUS at 03:59

## 2020-01-07 RX ADMIN — LIDOCAINE HYDROCHLORIDE 50 MG: 10 INJECTION, SOLUTION EPIDURAL; INFILTRATION; INTRACAUDAL at 15:14

## 2020-01-07 RX ADMIN — DEXAMETHASONE SODIUM PHOSPHATE 4 MG: 4 INJECTION, SOLUTION INTRA-ARTICULAR; INTRALESIONAL; INTRAMUSCULAR; INTRAVENOUS; SOFT TISSUE at 15:20

## 2020-01-07 RX ADMIN — FENTANYL CITRATE 50 MCG: 50 INJECTION, SOLUTION INTRAMUSCULAR; INTRAVENOUS at 23:11

## 2020-01-07 RX ADMIN — FAMOTIDINE 20 MG: 10 INJECTION, SOLUTION INTRAVENOUS at 18:46

## 2020-01-07 RX ADMIN — PIPERACILLIN SODIUM AND TAZOBACTAM SODIUM 3.38 G: 3; .375 INJECTION, POWDER, LYOPHILIZED, FOR SOLUTION INTRAVENOUS at 11:54

## 2020-01-07 RX ADMIN — PIPERACILLIN SODIUM AND TAZOBACTAM SODIUM 3.38 G: 3; .375 INJECTION, POWDER, LYOPHILIZED, FOR SOLUTION INTRAVENOUS at 20:05

## 2020-01-07 RX ADMIN — OXYCODONE HYDROCHLORIDE AND ACETAMINOPHEN 1 TABLET: 5; 325 TABLET ORAL at 21:42

## 2020-01-07 RX ADMIN — HYDROMORPHONE HYDROCHLORIDE 0.5 MG: 1 INJECTION, SOLUTION INTRAMUSCULAR; INTRAVENOUS; SUBCUTANEOUS at 17:33

## 2020-01-07 RX ADMIN — FENTANYL CITRATE 50 MCG: 50 INJECTION, SOLUTION INTRAMUSCULAR; INTRAVENOUS at 15:14

## 2020-01-07 RX ADMIN — ROCURONIUM BROMIDE 40 MG: 10 INJECTION, SOLUTION INTRAVENOUS at 15:14

## 2020-01-07 RX ADMIN — Medication 0.4 MG: at 16:45

## 2020-01-07 RX ADMIN — CEFAZOLIN 2000 MG: 1 INJECTION, POWDER, FOR SOLUTION INTRAMUSCULAR; INTRAVENOUS at 15:19

## 2020-01-07 RX ADMIN — FENTANYL CITRATE 50 MCG: 50 INJECTION, SOLUTION INTRAMUSCULAR; INTRAVENOUS at 15:40

## 2020-01-07 RX ADMIN — Medication 10 ML: at 20:09

## 2020-01-07 RX ADMIN — FAMOTIDINE 20 MG: 10 INJECTION, SOLUTION INTRAVENOUS at 06:42

## 2020-01-07 RX ADMIN — Medication 0.3 MG: at 15:45

## 2020-01-07 RX ADMIN — ROCURONIUM BROMIDE 10 MG: 10 INJECTION, SOLUTION INTRAVENOUS at 16:09

## 2020-01-07 RX ADMIN — Medication 3 MG: at 16:45

## 2020-01-07 RX ADMIN — PROCHLORPERAZINE EDISYLATE 5 MG: 5 INJECTION INTRAMUSCULAR; INTRAVENOUS at 17:13

## 2020-01-07 RX ADMIN — MIDAZOLAM 2 MG: 1 INJECTION INTRAMUSCULAR; INTRAVENOUS at 15:09

## 2020-01-07 RX ADMIN — KETOROLAC TROMETHAMINE 30 MG: 30 INJECTION, SOLUTION INTRAMUSCULAR at 18:09

## 2020-01-07 RX ADMIN — PROPOFOL 150 MG: 10 INJECTION, EMULSION INTRAVENOUS at 15:14

## 2020-01-07 ASSESSMENT — PULMONARY FUNCTION TESTS
PIF_VALUE: 17
PIF_VALUE: 17
PIF_VALUE: 18
PIF_VALUE: 20
PIF_VALUE: 19
PIF_VALUE: 17
PIF_VALUE: 18
PIF_VALUE: 23
PIF_VALUE: 21
PIF_VALUE: 19
PIF_VALUE: 19
PIF_VALUE: 22
PIF_VALUE: 23
PIF_VALUE: 23
PIF_VALUE: 22
PIF_VALUE: 17
PIF_VALUE: 19
PIF_VALUE: 17
PIF_VALUE: 23
PIF_VALUE: 23
PIF_VALUE: 19
PIF_VALUE: 2
PIF_VALUE: 17
PIF_VALUE: 17
PIF_VALUE: 20
PIF_VALUE: 22
PIF_VALUE: 23
PIF_VALUE: 21
PIF_VALUE: 19
PIF_VALUE: 2
PIF_VALUE: 3
PIF_VALUE: 22
PIF_VALUE: 22
PIF_VALUE: 17
PIF_VALUE: 22
PIF_VALUE: 34
PIF_VALUE: 17
PIF_VALUE: 17
PIF_VALUE: 22
PIF_VALUE: 0
PIF_VALUE: 19
PIF_VALUE: 23
PIF_VALUE: 0
PIF_VALUE: 24
PIF_VALUE: 22
PIF_VALUE: 17
PIF_VALUE: 21
PIF_VALUE: 17
PIF_VALUE: 22
PIF_VALUE: 23
PIF_VALUE: 17
PIF_VALUE: 23
PIF_VALUE: 17
PIF_VALUE: 17
PIF_VALUE: 22
PIF_VALUE: 17
PIF_VALUE: 1
PIF_VALUE: 19
PIF_VALUE: 1
PIF_VALUE: 23
PIF_VALUE: 19
PIF_VALUE: 18
PIF_VALUE: 23
PIF_VALUE: 23
PIF_VALUE: 1
PIF_VALUE: 17
PIF_VALUE: 4
PIF_VALUE: 13
PIF_VALUE: 23
PIF_VALUE: 17
PIF_VALUE: 22
PIF_VALUE: 19
PIF_VALUE: 20
PIF_VALUE: 23
PIF_VALUE: 18
PIF_VALUE: 23
PIF_VALUE: 18
PIF_VALUE: 17
PIF_VALUE: 18
PIF_VALUE: 22
PIF_VALUE: 17
PIF_VALUE: 22
PIF_VALUE: 22
PIF_VALUE: 23
PIF_VALUE: 18
PIF_VALUE: 0
PIF_VALUE: 23
PIF_VALUE: 19
PIF_VALUE: 18
PIF_VALUE: 22
PIF_VALUE: 23
PIF_VALUE: 17
PIF_VALUE: 23
PIF_VALUE: 23
PIF_VALUE: 22
PIF_VALUE: 17
PIF_VALUE: 22
PIF_VALUE: 17
PIF_VALUE: 20
PIF_VALUE: 22
PIF_VALUE: 18
PIF_VALUE: 17
PIF_VALUE: 21
PIF_VALUE: 4
PIF_VALUE: 18
PIF_VALUE: 18

## 2020-01-07 ASSESSMENT — PAIN DESCRIPTION - PAIN TYPE
TYPE: SURGICAL PAIN
TYPE: ACUTE PAIN;SURGICAL PAIN
TYPE: SURGICAL PAIN

## 2020-01-07 ASSESSMENT — LIFESTYLE VARIABLES: SMOKING_STATUS: 1

## 2020-01-07 ASSESSMENT — PAIN DESCRIPTION - LOCATION
LOCATION: ABDOMEN

## 2020-01-07 ASSESSMENT — PAIN SCALES - GENERAL
PAINLEVEL_OUTOF10: 10
PAINLEVEL_OUTOF10: 9
PAINLEVEL_OUTOF10: 6
PAINLEVEL_OUTOF10: 9
PAINLEVEL_OUTOF10: 10

## 2020-01-07 NOTE — DISCHARGE INSTR - COC
10/14/2003    Polio IPV (IPOL) 2000, 2000, 08/21/2000, 07/22/2004    Tdap (Boostrix, Adacel) 07/19/2011, 10/11/2019    Varicella (Varivax) 01/21/2002, 08/23/2010       Active Problems:  Patient Active Problem List   Diagnosis Code    PTSD F43.10    Polycystic ovarian disease E28.2    Iron deficiency anemia (10.8) D50.8    Bipolar disorder F31.9    THC use F12.10    Tobacco use Z72.0    Postpartum care following vaginal delivery Z39.2    Routine postpartum follow-up Z39.2    Cholecystitis K81.9    Calculus of gallbladder with acute cholecystitis and obstruction K80.01    RUQ pain R10.11    Elevated LFTs R94.5    Elevated transaminase level R74.0       Isolation/Infection:   Isolation          No Isolation        Patient Infection Status     None to display          Nurse Assessment:  Last Vital Signs: /72   Pulse 87   Temp 98.2 °F (36.8 °C) (Oral)   Resp 14   Ht 5' 1\" (1.549 m)   Wt 163 lb 5.8 oz (74.1 kg)   LMP 12/31/2019   SpO2 99%   BMI 30.87 kg/m²     Last documented pain score (0-10 scale): Pain Level: 0  Last Weight:   Wt Readings from Last 1 Encounters:   01/02/20 163 lb 5.8 oz (74.1 kg)     Mental Status:  {IP PT MENTAL STATUS:68020}    IV Access:  { RACHNA IV ACCESS:[id number]}    Nursing Mobility/ADLs:  Walking   {[facility] DME HLVC:259249415}  Transfer  {[facility] DME ZRDQ:148409730}  Bathing  {[facility] DME DWOR:241629062}  Dressing  {[facility] DME LYGM:382142317}  Toileting  {[facility] DME VKRZ:341626240}  Feeding  {[facility] DME EZLP:198608149}  Med Admin  {[facility] DME HLGH:640746593}  Med Delivery   { RACHNA MED Delivery:921942672}    Wound Care Documentation and Therapy:        Elimination:  Continence:   · Bowel: {YES / ARSH:14144}  · Bladder: {YES / IZ:31811}  Urinary Catheter: {Urinary Catheter:101247792}   Colostomy/Ileostomy/Ileal Conduit: {YES / UR:31775}       Date of Last BM: ***    Intake/Output Summary (Last 24 hours) at 1/7/2020 1136  Last data filed at 1/6/2020 1846  Gross per 24 hour

## 2020-01-07 NOTE — PROGRESS NOTES
Stone removed with ERCP yesterday. Liver enzymes essentially unchanged.      Surgery to evaluate for cholecystectomy    Continue supportive management    Lee Ann Brewer MD 1/7/2020 3229
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
## 2020-01-07 NOTE — PLAN OF CARE
Nutrition Problem: Increased nutrient needs  Intervention: Food and/or Nutrient Delivery: Continue NPO  Nutritional Goals: Nutritional intake is greater than 75% of estimated nutrition needs

## 2020-01-07 NOTE — OP NOTE
Procedure    ERCP    ERCP with papillotomy    ERCP with balloon sweeps    ERCP with stone extraction    Preop diagnosis    Elevated liver enzymes  Abdominal pain  Cholecystitis      Postop diagnosis    Minimally dilated common bile duct  1.2 cm papillotomy was made  A 6 to 7 mm stone was removed the help of balloon sweep    Surgeon    Eileen Lora M.D.     Anesthesia    As per anesthesia notes    Blood loss    None    Procedure notes    Procedure was explained to the patient and to her boyfriend all the risk benefits and alternatives including bleeding perforation cardiorespiratory problem pancreatitis were all explained to them informed consent was taken patient is brought to the endoscopy area placed in appropriate ERCP position and sedated as per anesthesia    A bite-block was placed and a side-viewing ERCP scope was then passed to the back of the throat esophagus was intubated without difficulty stomach showed some retained food  Descending duodenum was reached ampulla was identified which appeared normal in caliber and configuration    Papillotome then was passed through the endoscope and common bile duct was cannulated contrast material was injected minimal dilation was noted no strictures were noted    Approximately 1.2 cm papillotomy was made    Multiple balloon sweeps were done at approximately 6 to 7 mm stone was extracted yellow in color    Occlusive cholangiogram did not reveal any other significant pathology    The resection scope was withdrawn patient tolerated the procedure well    Recommendation    Follow-up LFTs    Laparoscopy cholecystectomy as per surgical recommendations    Discussed with patient's family
Steri-Strips applied. Sterile sling was applied. Patient tolerated procedure well and was transferred to the recovery room in a stable condition. Recommendations: Operative findings are discussed with the family. Postoperative care recovery restrictions follow-up were all discussed. Prescriptions are in the chart. Likely discharge tomorrow.
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
## 2020-01-07 NOTE — PROGRESS NOTES
Nutrition Assessment    Type and Reason for Visit: Initial    Nutrition Recommendations: NPO status. Advance diet when appropriate. Nutrition Assessment: Patient is mildly malnourished as evidence by decreased p.o intakes and 5.8% loss in 1.5 months. Patient is at risk for further compromise due to inadequate oral intake related to altered GI function. Patient is status post ERCP with stone removal 1/16/20. Patient is NPO today for lab cholecystectomy. Advance diet when appropriate. Monitor for nutrition progression and wound healing status. Malnutrition Assessment:  · Malnutrition Status: Mild Malnutrition  · Context: Acute illness or injury  · Findings of the 6 clinical characteristics of malnutrition (Minimum of 2 out of 6 clinical characteristics is required to make the diagnosis of moderate or severe Protein Calorie Malnutrition based on AND/ASPEN Guidelines):  1. Energy Intake-Less than or equal to 75% of estimated energy requirement, Greater than or equal to 1 month    2. Weight Loss-5% loss or greater, (1.5 months)  3. Fat Loss-No significant subcutaneous fat loss,    4. Muscle Loss-No significant muscle mass loss,    5. Fluid Accumulation-No significant fluid accumulation, Extremities  6.  Strength-Not measured    Nutrition Risk Level:  Moderate    Nutrient Needs:  · Estimated Daily Total Kcal: 8526-0148 kcal based on [facility] with 1.2 factor (74.1 kg)  · Estimated Daily Protein (g): 57-67 gm of protein based on 1.2-1.4 gm/kg of admission weight     Nutrition Diagnosis:   · Problem: Increased nutrient needs  · Etiology: related to Alteration in GI function     Signs and symptoms:  as evidenced by Presence of wounds, GI abnormality(status post cholecystecomy )    Objective Information:  · Nutrition-Focused Physical Findings: No edema  · Wound Type: Surgical Wound  · Current Nutrition Therapies:  · Oral Diet Orders: NPO   · Oral Diet intake: NPO  · Oral Nutrition Supplement (ONS) Orders: None  · Anthropometric Measures:  · Ht: 5' 1\" (154.9 cm)   · Current Body Wt: 163 lb (73.9 kg)  · Admission Body Wt: 163 lb (73.9 kg)  · Usual Body Wt: 173 lb (78.5 kg)(11/14/19)  · % Weight Change:  ,  5.8% weight loss in 1.5 months  · Ideal Body Wt: 105 lb (47.6 kg), % Ideal Body 155%  · BMI Classification: BMI 30.0 - 34.9 Obese Class I    Nutrition Interventions:   Continue NPO  Continued Inpatient Monitoring    Nutrition Evaluation:   · Evaluation: Goals set   · Goals: Nutritional intake is greater than 75% of estimated nutrition needs    · Monitoring: Weight, Pertinent Labs, Monitor Bowel Function, Nutrition Progression, Skin Integrity, Wound Healing, I&O      Susan [facility], RJANINA, L.JULIANNA,  Clinical Dietitian  Cell # [phone]  Office # [phone]

## 2020-01-08 VITALS
SYSTOLIC BLOOD PRESSURE: 126 MMHG | HEART RATE: 73 BPM | DIASTOLIC BLOOD PRESSURE: 78 MMHG | RESPIRATION RATE: 16 BRPM | BODY MASS INDEX: 30.84 KG/M2 | OXYGEN SATURATION: 98 % | WEIGHT: 163.36 LBS | HEIGHT: 61 IN | TEMPERATURE: 98.1 F

## 2020-01-08 LAB
ABSOLUTE EOS #: 0 K/UL (ref 0–0.4)
ABSOLUTE IMMATURE GRANULOCYTE: ABNORMAL K/UL (ref 0–0.3)
ABSOLUTE LYMPH #: 1.6 K/UL (ref 1.2–5.2)
ABSOLUTE MONO #: 0.9 K/UL (ref 0.1–1.3)
ALBUMIN SERPL-MCNC: 3.6 G/DL (ref 3.5–5.2)
ALBUMIN/GLOBULIN RATIO: ABNORMAL (ref 1–2.5)
ALP BLD-CCNC: 267 U/L (ref 35–104)
ALT SERPL-CCNC: 234 U/L (ref 5–33)
ANION GAP SERPL CALCULATED.3IONS-SCNC: 12 MMOL/L (ref 9–17)
AST SERPL-CCNC: 181 U/L
BASOPHILS # BLD: 0 % (ref 0–2)
BASOPHILS ABSOLUTE: 0 K/UL (ref 0–0.2)
BILIRUB SERPL-MCNC: 2.71 MG/DL (ref 0.3–1.2)
BILIRUBIN DIRECT: 2.08 MG/DL
BILIRUBIN, INDIRECT: 0.63 MG/DL (ref 0–1)
BUN BLDV-MCNC: 8 MG/DL (ref 6–20)
BUN/CREAT BLD: ABNORMAL (ref 9–20)
CALCIUM SERPL-MCNC: 8.9 MG/DL (ref 8.6–10.4)
CHLORIDE BLD-SCNC: 103 MMOL/L (ref 98–107)
CO2: 24 MMOL/L (ref 20–31)
CREAT SERPL-MCNC: 0.47 MG/DL (ref 0.5–0.9)
DIFFERENTIAL TYPE: ABNORMAL
EOSINOPHILS RELATIVE PERCENT: 0 % (ref 0–4)
GFR AFRICAN AMERICAN: >60 ML/MIN
GFR NON-AFRICAN AMERICAN: >60 ML/MIN
GFR SERPL CREATININE-BSD FRML MDRD: ABNORMAL ML/MIN/{1.73_M2}
GFR SERPL CREATININE-BSD FRML MDRD: ABNORMAL ML/MIN/{1.73_M2}
GLOBULIN: ABNORMAL G/DL (ref 1.5–3.8)
GLUCOSE BLD-MCNC: 147 MG/DL (ref 70–99)
HCT VFR BLD CALC: 39.5 % (ref 36–46)
HEMOGLOBIN: 13.4 G/DL (ref 12–16)
IMMATURE GRANULOCYTES: ABNORMAL %
LYMPHOCYTES # BLD: 19 % (ref 25–45)
MCH RBC QN AUTO: 29.3 PG (ref 26–34)
MCHC RBC AUTO-ENTMCNC: 34 G/DL (ref 31–37)
MCV RBC AUTO: 86.2 FL (ref 80–100)
MONOCYTES # BLD: 11 % (ref 2–8)
NRBC AUTOMATED: ABNORMAL PER 100 WBC
PDW BLD-RTO: 13.4 % (ref 11.5–14.9)
PLATELET # BLD: 316 K/UL (ref 150–450)
PLATELET ESTIMATE: ABNORMAL
PMV BLD AUTO: 8 FL (ref 6–12)
POTASSIUM SERPL-SCNC: 3.7 MMOL/L (ref 3.7–5.3)
RBC # BLD: 4.58 M/UL (ref 4–5.2)
RBC # BLD: ABNORMAL 10*6/UL
SEG NEUTROPHILS: 70 % (ref 34–64)
SEGMENTED NEUTROPHILS ABSOLUTE COUNT: 5.9 K/UL (ref 1.3–9.1)
SODIUM BLD-SCNC: 139 MMOL/L (ref 135–144)
TOTAL PROTEIN: 7.1 G/DL (ref 6.4–8.3)
WBC # BLD: 8.4 K/UL (ref 4.5–13.5)
WBC # BLD: ABNORMAL 10*3/UL

## 2020-01-08 PROCEDURE — 2500000003 HC RX 250 WO HCPCS: Performed by: SURGERY

## 2020-01-08 PROCEDURE — 99231 SBSQ HOSP IP/OBS SF/LOW 25: CPT | Performed by: FAMILY MEDICINE

## 2020-01-08 PROCEDURE — 99232 SBSQ HOSP IP/OBS MODERATE 35: CPT | Performed by: INTERNAL MEDICINE

## 2020-01-08 PROCEDURE — 36415 COLL VENOUS BLD VENIPUNCTURE: CPT

## 2020-01-08 PROCEDURE — 2580000003 HC RX 258: Performed by: SURGERY

## 2020-01-08 PROCEDURE — 80048 BASIC METABOLIC PNL TOTAL CA: CPT

## 2020-01-08 PROCEDURE — 6370000000 HC RX 637 (ALT 250 FOR IP): Performed by: SURGERY

## 2020-01-08 PROCEDURE — 85025 COMPLETE CBC W/AUTO DIFF WBC: CPT

## 2020-01-08 PROCEDURE — 80076 HEPATIC FUNCTION PANEL: CPT

## 2020-01-08 PROCEDURE — 6360000002 HC RX W HCPCS: Performed by: SURGERY

## 2020-01-08 RX ADMIN — PIPERACILLIN SODIUM AND TAZOBACTAM SODIUM 3.38 G: 3; .375 INJECTION, POWDER, LYOPHILIZED, FOR SOLUTION INTRAVENOUS at 04:20

## 2020-01-08 RX ADMIN — FAMOTIDINE 20 MG: 10 INJECTION, SOLUTION INTRAVENOUS at 18:08

## 2020-01-08 RX ADMIN — OXYCODONE HYDROCHLORIDE AND ACETAMINOPHEN 1 TABLET: 5; 325 TABLET ORAL at 06:28

## 2020-01-08 RX ADMIN — OXYCODONE HYDROCHLORIDE AND ACETAMINOPHEN 1 TABLET: 5; 325 TABLET ORAL at 15:51

## 2020-01-08 RX ADMIN — CEFAZOLIN SODIUM 1 G: 1 INJECTION, POWDER, FOR SOLUTION INTRAMUSCULAR; INTRAVENOUS at 09:05

## 2020-01-08 RX ADMIN — FAMOTIDINE 20 MG: 10 INJECTION, SOLUTION INTRAVENOUS at 06:24

## 2020-01-08 RX ADMIN — FENTANYL CITRATE 50 MCG: 50 INJECTION, SOLUTION INTRAMUSCULAR; INTRAVENOUS at 00:51

## 2020-01-08 RX ADMIN — PIPERACILLIN SODIUM AND TAZOBACTAM SODIUM 3.38 G: 3; .375 INJECTION, POWDER, LYOPHILIZED, FOR SOLUTION INTRAVENOUS at 11:33

## 2020-01-08 RX ADMIN — CEFAZOLIN SODIUM 1 G: 1 INJECTION, POWDER, FOR SOLUTION INTRAMUSCULAR; INTRAVENOUS at 17:15

## 2020-01-08 RX ADMIN — OXYCODONE HYDROCHLORIDE AND ACETAMINOPHEN 1 TABLET: 5; 325 TABLET ORAL at 11:28

## 2020-01-08 RX ADMIN — CEFAZOLIN SODIUM 1 G: 1 INJECTION, POWDER, FOR SOLUTION INTRAMUSCULAR; INTRAVENOUS at 00:23

## 2020-01-08 RX ADMIN — ENOXAPARIN SODIUM 40 MG: 40 INJECTION SUBCUTANEOUS at 08:11

## 2020-01-08 ASSESSMENT — PAIN SCALES - GENERAL
PAINLEVEL_OUTOF10: 8
PAINLEVEL_OUTOF10: 8
PAINLEVEL_OUTOF10: 6
PAINLEVEL_OUTOF10: 8
PAINLEVEL_OUTOF10: 7
PAINLEVEL_OUTOF10: 7

## 2020-01-08 ASSESSMENT — PAIN DESCRIPTION - LOCATION: LOCATION: ABDOMEN

## 2020-01-08 ASSESSMENT — PAIN DESCRIPTION - PAIN TYPE: TYPE: SURGICAL PAIN
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
## 2020-01-08 NOTE — PROGRESS NOTES
temperature source Oral, resp. rate 18, height 5' 1\" (1.549 m), weight 163 lb 5.8 oz (74.1 kg), last menstrual period 12/31/2019, SpO2 97 %, unknown if currently breastfeeding. Subjective:  Symptoms:  Stable. (Pain improved. Tolerating breakfast. ). Diet:  Adequate intake. Activity level: Returning to normal.      Objective:  General Appearance:  Comfortable. Vital signs: (most recent): Blood pressure (!) 137/95, pulse 92, temperature 97.3 °F (36.3 °C), temperature source Oral, resp. rate 18, height 5' 1\" (1.549 m), weight 163 lb 5.8 oz (74.1 kg), last menstrual period 12/31/2019, SpO2 97 %, unknown if currently breastfeeding. Vital signs are normal.      Assessment & Plan     S/p robotic joesph - liver enzymes elevated.  Per GI/surgery    Sean Braga MD  1/8/2020

## 2020-01-08 NOTE — CARE COORDINATION
ONGOING DISCHARGE PLAN:    Spoke with patient regarding discharge plan and patient confirms that plan is still to go home with no needs. POD #1 - Robotic cholecystectomy    Liver Enzymes elevted -- , , Total bilirubin 2.71, Alk Phos 267    Gen surg following,    Remains on IV Zosyn, Iv Ancef    General Diet    Will continue to follow for additional discharge needs.     Electronically signed by Xiang Boothe RN on 1/8/2020 at 2:34 PM

## 2020-01-08 NOTE — FLOWSHEET NOTE
Ok to discharge to home per dr Madelyn Coe. Ok to shower, no lifting for 2 weeks. Remove dressing by Friday and ok to leave [facility].  Follow uo in Dr Anny Mendoza office in 2 weeks

## 2020-01-08 NOTE — PLAN OF CARE
Problem: Pain:  Goal: Pain level will decrease  Description  Pain level will decrease  1/8/2020 0356 by Alee Strong RN  Outcome: Ongoing  Note:   Pain assessed. Medicated as ordered. Patient tolerating. Will continue to monitor      Problem: Bowel/Gastric:  Goal: Bowel function will improve  Description  Bowel function will improve  Outcome: Ongoing  Note:   Bowel sounds active. Will continue to monitor for other signs      Problem: Bowel/Gastric:  Goal: Occurrences of nausea will decrease  Description  Occurrences of nausea will decrease  Outcome: Ongoing  Note:   Medicated as ordered. Nausea decreased. Will continue to monitor      Problem: Sensory:  Goal: Pain level will decrease  Description  Pain level will decrease  1/8/2020 0356 by Alee Strong RN  Outcome: Ongoing  Note:   Pain assessed. Medicated as ordered. Patient tolerating.   Will continue to monitor

## 2020-01-09 LAB — SURGICAL PATHOLOGY REPORT: NORMAL
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
## 2020-01-20 ENCOUNTER — PROCEDURE VISIT (OUTPATIENT)
Dept: OBGYN CLINIC | Age: 20
End: 2020-01-20
Payer: COMMERCIAL

## 2020-01-20 VITALS
BODY MASS INDEX: 30.4 KG/M2 | HEART RATE: 90 BPM | DIASTOLIC BLOOD PRESSURE: 87 MMHG | SYSTOLIC BLOOD PRESSURE: 138 MMHG | WEIGHT: 161 LBS | HEIGHT: 61 IN

## 2020-01-20 PROBLEM — Z13.9 SCREENING PROCEDURE: Status: ACTIVE | Noted: 2019-11-26

## 2020-01-20 PROBLEM — Z30.017 INSERTION OF NEXPLANON: Status: ACTIVE | Noted: 2019-11-26

## 2020-01-20 LAB
CONTROL: NORMAL
PREGNANCY TEST URINE, POC: NORMAL

## 2020-01-20 PROCEDURE — 81025 URINE PREGNANCY TEST: CPT | Performed by: SPECIALIST

## 2020-01-20 PROCEDURE — 11981 INSERTION DRUG DLVR IMPLANT: CPT | Performed by: SPECIALIST

## 2020-01-25 ASSESSMENT — ENCOUNTER SYMPTOMS
NAUSEA: 0
VOMITING: 0
ABDOMINAL PAIN: 0
DIARRHEA: 0
EYE PAIN: 0
ABDOMINAL DISTENTION: 0
APNEA: 0
CONSTIPATION: 0
COUGH: 0

## 2020-01-26 PROBLEM — Z32.02 NEGATIVE PREGNANCY TEST: Status: ACTIVE | Noted: 2020-01-26
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
## 2020-11-09 ENCOUNTER — OFFICE VISIT (OUTPATIENT)
Dept: PRIMARY CARE CLINIC | Age: 20
End: 2020-11-09
Payer: COMMERCIAL

## 2020-11-09 ENCOUNTER — HOSPITAL ENCOUNTER (OUTPATIENT)
Age: 20
Setting detail: SPECIMEN
Discharge: HOME | End: 2020-11-09
Payer: COMMERCIAL

## 2020-11-09 VITALS
BODY MASS INDEX: 35.87 KG/M2 | HEIGHT: 61 IN | WEIGHT: 190 LBS | HEART RATE: 109 BPM | OXYGEN SATURATION: 98 % | TEMPERATURE: 99.7 F

## 2020-11-09 PROCEDURE — 99213 OFFICE O/P EST LOW 20 MIN: CPT | Performed by: NURSE PRACTITIONER

## 2020-11-09 PROCEDURE — G8417 CALC BMI ABV UP PARAM F/U: HCPCS | Performed by: NURSE PRACTITIONER

## 2020-11-09 PROCEDURE — G8482 FLU IMMUNIZE ORDER/ADMIN: HCPCS | Performed by: NURSE PRACTITIONER

## 2020-11-09 PROCEDURE — G8427 DOCREV CUR MEDS BY ELIG CLIN: HCPCS | Performed by: NURSE PRACTITIONER

## 2020-11-09 PROCEDURE — 4004F PT TOBACCO SCREEN RCVD TLK: CPT | Performed by: NURSE PRACTITIONER

## 2020-11-09 ASSESSMENT — PATIENT HEALTH QUESTIONNAIRE - PHQ9
1. LITTLE INTEREST OR PLEASURE IN DOING THINGS: 0
SUM OF ALL RESPONSES TO PHQ QUESTIONS 1-9: 0
SUM OF ALL RESPONSES TO PHQ QUESTIONS 1-9: 0
SUM OF ALL RESPONSES TO PHQ9 QUESTIONS 1 & 2: 0
2. FEELING DOWN, DEPRESSED OR HOPELESS: 0
SUM OF ALL RESPONSES TO PHQ QUESTIONS 1-9: 0

## 2020-11-10 ASSESSMENT — ENCOUNTER SYMPTOMS
VOMITING: 0
COUGH: 1
RHINORRHEA: 1

## 2020-11-10 NOTE — PROGRESS NOTES
Constitutional:       General: She is not in acute distress. Appearance: Normal appearance. She is well-developed. She is not ill-appearing, toxic-appearing or diaphoretic. HENT:      Head: Normocephalic and atraumatic. Nose: Nose normal.      Mouth/Throat:      Mouth: Mucous membranes are moist.   Eyes:      General: No scleral icterus. Right eye: No discharge. Left eye: No discharge. Neck:      Musculoskeletal: Normal range of motion and neck supple. No neck rigidity. Cardiovascular:      Rate and Rhythm: Normal rate and regular rhythm. Heart sounds: Normal heart sounds. Pulmonary:      Effort: Pulmonary effort is normal. No respiratory distress. Breath sounds: Normal breath sounds. No stridor. No wheezing, rhonchi or rales. Abdominal:      General: Bowel sounds are normal.      Palpations: Abdomen is soft. Tenderness: There is no abdominal tenderness. Musculoskeletal: Normal range of motion. General: No signs of injury. Skin:     General: Skin is warm and dry. Coloration: Skin is not jaundiced or pale. Findings: No erythema or rash. Neurological:      General: No focal deficit present. Mental Status: She is alert and oriented to person, place, and time. Psychiatric:         Mood and Affect: Mood normal.         Behavior: Behavior normal.       Pulse 109   Temp 99.7 °F (37.6 °C) (Infrared)   Ht 5' 1\" (1.549 m)   Wt 190 lb (86.2 kg)   SpO2 98%   BMI 35.90 kg/m²     Assessment:          Diagnosis Orders   1. Viral illness  Covid-19 Ambulatory       Plan: You were tested for COVID today. We will call you with results when they are available. If you have not heard from us in 7 days, please call our office. Patient was evaluated carside today during this pandemic covid 19 situation.     Quarantine as directed  Await results  Increase fluids- stay hydrated  Good handwashing  Supportive care as discussed    If having symptoms-

## 2020-11-14 LAB — SARS-COV-2, NAA: NOT DETECTED

## 2020-12-08 ENCOUNTER — OFFICE VISIT (OUTPATIENT)
Dept: OBGYN CLINIC | Age: 20
End: 2020-12-08
Payer: COMMERCIAL

## 2020-12-08 VITALS
WEIGHT: 193 LBS | BODY MASS INDEX: 36.44 KG/M2 | HEART RATE: 80 BPM | SYSTOLIC BLOOD PRESSURE: 122 MMHG | DIASTOLIC BLOOD PRESSURE: 84 MMHG | HEIGHT: 61 IN

## 2020-12-08 PROBLEM — Z30.46 NEXPLANON REMOVAL: Status: ACTIVE | Noted: 2020-12-08

## 2020-12-08 PROCEDURE — 11982 REMOVE DRUG IMPLANT DEVICE: CPT | Performed by: SPECIALIST

## 2020-12-08 ASSESSMENT — ENCOUNTER SYMPTOMS
NAUSEA: 0
ABDOMINAL PAIN: 0
EYE PAIN: 0
APNEA: 0
CONSTIPATION: 0
VOMITING: 0
COUGH: 0
ABDOMINAL DISTENTION: 0
DIARRHEA: 0
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
## 2021-02-05 ENCOUNTER — OFFICE VISIT (OUTPATIENT)
Dept: OBGYN CLINIC | Age: 21
End: 2021-02-05
Payer: COMMERCIAL

## 2021-02-05 ENCOUNTER — HOSPITAL ENCOUNTER (OUTPATIENT)
Age: 21
Setting detail: SPECIMEN
Discharge: HOME | End: 2021-02-05
Payer: COMMERCIAL

## 2021-02-05 VITALS
HEART RATE: 82 BPM | WEIGHT: 191.6 LBS | DIASTOLIC BLOOD PRESSURE: 85 MMHG | SYSTOLIC BLOOD PRESSURE: 124 MMHG | RESPIRATION RATE: 16 BRPM | BODY MASS INDEX: 36.17 KG/M2 | TEMPERATURE: 97.3 F | HEIGHT: 61 IN

## 2021-02-05 DIAGNOSIS — Z11.3 SCREENING EXAMINATION FOR STD (SEXUALLY TRANSMITTED DISEASE): ICD-10-CM

## 2021-02-05 DIAGNOSIS — N76.0 ACUTE VAGINITIS: ICD-10-CM

## 2021-02-05 DIAGNOSIS — Z01.419 WELL WOMAN EXAM WITH ROUTINE GYNECOLOGICAL EXAM: Primary | ICD-10-CM

## 2021-02-05 LAB
DIRECT EXAM: ABNORMAL
Lab: ABNORMAL
SPECIMEN DESCRIPTION: ABNORMAL

## 2021-02-05 PROCEDURE — 99395 PREV VISIT EST AGE 18-39: CPT | Performed by: SPECIALIST

## 2021-02-05 PROCEDURE — G8482 FLU IMMUNIZE ORDER/ADMIN: HCPCS | Performed by: SPECIALIST

## 2021-02-05 RX ORDER — FLUCONAZOLE 100 MG/1
100 TABLET ORAL DAILY
Qty: 7 TABLET | Refills: 0 | Status: SHIPPED | OUTPATIENT
Start: 2021-02-05 | End: 2021-02-12

## 2021-02-05 RX ORDER — METRONIDAZOLE 500 MG/1
500 TABLET ORAL 2 TIMES DAILY
Qty: 14 TABLET | Refills: 0 | Status: SHIPPED | OUTPATIENT
Start: 2021-02-05 | End: 2021-02-12

## 2021-02-05 ASSESSMENT — ENCOUNTER SYMPTOMS
CONSTIPATION: 0
ABDOMINAL DISTENTION: 0
ABDOMINAL PAIN: 0
DIARRHEA: 0
COUGH: 0
VOMITING: 0
NAUSEA: 0
EYE PAIN: 0
APNEA: 0
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
## 2021-02-18 LAB — CYTOLOGY REPORT: NORMAL

## 2021-03-07 PROBLEM — Z11.3 SCREENING EXAMINATION FOR STD (SEXUALLY TRANSMITTED DISEASE): Status: RESOLVED | Noted: 2019-11-26 | Resolved: 2021-03-07

## 2021-09-20 ENCOUNTER — OFFICE VISIT (OUTPATIENT)
Dept: OBGYN CLINIC | Age: 21
End: 2021-09-20
Payer: COMMERCIAL

## 2021-09-20 ENCOUNTER — HOSPITAL ENCOUNTER (OUTPATIENT)
Age: 21
Setting detail: SPECIMEN
Discharge: HOME | End: 2021-09-20
Payer: COMMERCIAL

## 2021-09-20 VITALS
BODY MASS INDEX: 36.63 KG/M2 | HEART RATE: 82 BPM | DIASTOLIC BLOOD PRESSURE: 78 MMHG | HEIGHT: 61 IN | SYSTOLIC BLOOD PRESSURE: 116 MMHG | WEIGHT: 194 LBS

## 2021-09-20 DIAGNOSIS — N92.6 MISSED MENSES: Primary | ICD-10-CM

## 2021-09-20 DIAGNOSIS — N91.2 AMENORRHEA: ICD-10-CM

## 2021-09-20 DIAGNOSIS — Z32.02 NEGATIVE PREGNANCY TEST: ICD-10-CM

## 2021-09-20 DIAGNOSIS — N92.6 MISSED MENSES: ICD-10-CM

## 2021-09-20 DIAGNOSIS — Z87.42 HISTORY OF PCOS: ICD-10-CM

## 2021-09-20 LAB
CONTROL: NORMAL
HCG QUANTITATIVE: <1 IU/L
PREGNANCY TEST URINE, POC: NORMAL

## 2021-09-20 PROCEDURE — 81025 URINE PREGNANCY TEST: CPT | Performed by: CLINICAL NURSE SPECIALIST

## 2021-09-20 PROCEDURE — 99213 OFFICE O/P EST LOW 20 MIN: CPT | Performed by: CLINICAL NURSE SPECIALIST

## 2021-09-20 PROCEDURE — G8427 DOCREV CUR MEDS BY ELIG CLIN: HCPCS | Performed by: CLINICAL NURSE SPECIALIST

## 2021-09-20 PROCEDURE — G8417 CALC BMI ABV UP PARAM F/U: HCPCS | Performed by: CLINICAL NURSE SPECIALIST

## 2021-09-20 PROCEDURE — 4004F PT TOBACCO SCREEN RCVD TLK: CPT | Performed by: CLINICAL NURSE SPECIALIST

## 2021-09-20 NOTE — PROGRESS NOTES
Patient was in the office today for a *qbhcg    Draw per physician order using sterile technique.   Drawn from the l antecubital
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
## 2021-09-21 ENCOUNTER — TELEPHONE (OUTPATIENT)
Dept: OBGYN CLINIC | Age: 21
End: 2021-09-21

## 2021-09-21 DIAGNOSIS — N91.2 AMENORRHEA: Primary | ICD-10-CM

## 2021-09-21 RX ORDER — MEDROXYPROGESTERONE ACETATE 10 MG/1
10 TABLET ORAL DAILY
Qty: 10 TABLET | Refills: 0 | Status: SHIPPED | OUTPATIENT
Start: 2021-09-21 | End: 2022-09-26

## 2021-09-21 NOTE — TELEPHONE ENCOUNTER
----- Message from JARED Rowan CNP sent at 9/21/2021  8:03 AM EDT -----  Please let the patient know that there is no possibility of pregnancy at this time, and I sent provera 10 mg she is to take 1 tablet daily for 10 days and this should induce a menses within 14 days.

## 2021-09-22 ENCOUNTER — TELEPHONE (OUTPATIENT)
Dept: OBGYN CLINIC | Age: 21
End: 2021-09-22

## 2021-09-22 NOTE — TELEPHONE ENCOUNTER
----- Message from JARED Hurst CNP sent at 9/21/2021  8:03 AM EDT -----  Please let the patient know that there is no possibility of pregnancy at this time, and I sent provera 10 mg she is to take 1 tablet daily for 10 days and this should induce a menses within 14 days.

## 2021-10-02 NOTE — TELEPHONE ENCOUNTER
Left message for patient to call back. ----- Message from JARED Garcia CNP sent at 5/15/2019  1:14 PM EDT -----  Please let the patient knwo that she was positive for chlamydia and I sent zithromax she is to take both tabs in 1 dose, partner needs tx and no intercourse for 1 wk after tx is complete.   She will need recultured in 4 wks    thanks no

## 2022-01-12 ENCOUNTER — HOSPITAL ENCOUNTER (OUTPATIENT)
Age: 22
Setting detail: SPECIMEN
Discharge: HOME | End: 2022-01-12

## 2022-01-12 DIAGNOSIS — F31.9 BIPOLAR DEPRESSION (HCC): ICD-10-CM

## 2022-01-12 LAB
ALBUMIN SERPL-MCNC: 4.6 G/DL (ref 3.5–5.2)
ALBUMIN/GLOBULIN RATIO: 1.3 (ref 1–2.5)
ALP BLD-CCNC: 91 U/L (ref 35–104)
ALT SERPL-CCNC: 28 U/L (ref 5–33)
ANION GAP SERPL CALCULATED.3IONS-SCNC: 16 MMOL/L (ref 9–17)
AST SERPL-CCNC: 23 U/L
BILIRUB SERPL-MCNC: 0.37 MG/DL (ref 0.3–1.2)
BUN BLDV-MCNC: 10 MG/DL (ref 6–20)
BUN/CREAT BLD: NORMAL (ref 9–20)
CALCIUM SERPL-MCNC: 9.3 MG/DL (ref 8.6–10.4)
CHLORIDE BLD-SCNC: 101 MMOL/L (ref 98–107)
CO2: 23 MMOL/L (ref 20–31)
CREAT SERPL-MCNC: 0.84 MG/DL (ref 0.5–0.9)
GFR AFRICAN AMERICAN: >60 ML/MIN
GFR NON-AFRICAN AMERICAN: >60 ML/MIN
GFR SERPL CREATININE-BSD FRML MDRD: NORMAL ML/MIN/{1.73_M2}
GFR SERPL CREATININE-BSD FRML MDRD: NORMAL ML/MIN/{1.73_M2}
GLUCOSE BLD-MCNC: 72 MG/DL (ref 70–99)
HCT VFR BLD CALC: 45.3 % (ref 36.3–47.1)
HEMOGLOBIN: 14.9 G/DL (ref 11.9–15.1)
MCH RBC QN AUTO: 29.1 PG (ref 25.2–33.5)
MCHC RBC AUTO-ENTMCNC: 32.9 G/DL (ref 28.4–34.8)
MCV RBC AUTO: 88.5 FL (ref 82.6–102.9)
NRBC AUTOMATED: 0 PER 100 WBC
PDW BLD-RTO: 12.8 % (ref 11.8–14.4)
PLATELET # BLD: 308 K/UL (ref 138–453)
PMV BLD AUTO: 10.6 FL (ref 8.1–13.5)
POTASSIUM SERPL-SCNC: 4.5 MMOL/L (ref 3.7–5.3)
RBC # BLD: 5.12 M/UL (ref 3.95–5.11)
SODIUM BLD-SCNC: 140 MMOL/L (ref 135–144)
TOTAL PROTEIN: 8.2 G/DL (ref 6.4–8.3)
TSH SERPL DL<=0.05 MIU/L-ACNC: 2.72 MIU/L (ref 0.3–5)
WBC # BLD: 7.5 K/UL (ref 3.5–11.3)

## 2022-01-14 LAB — T4 TOTAL: 6.2 UG/DL (ref 4.5–10.9)

## 2022-02-11 ENCOUNTER — HOSPITAL ENCOUNTER (EMERGENCY)
Age: 22
Discharge: HOME | End: 2022-02-11
Attending: EMERGENCY MEDICINE
Payer: COMMERCIAL

## 2022-02-11 ENCOUNTER — APPOINTMENT (OUTPATIENT)
Dept: GENERAL RADIOLOGY | Age: 22
End: 2022-02-11
Payer: COMMERCIAL

## 2022-02-11 VITALS
OXYGEN SATURATION: 97 % | WEIGHT: 195 LBS | HEART RATE: 60 BPM | DIASTOLIC BLOOD PRESSURE: 81 MMHG | TEMPERATURE: 97.6 F | SYSTOLIC BLOOD PRESSURE: 127 MMHG | RESPIRATION RATE: 18 BRPM | HEIGHT: 61 IN | BODY MASS INDEX: 36.82 KG/M2

## 2022-02-11 DIAGNOSIS — R07.9 CHEST PAIN, UNSPECIFIED TYPE: Primary | ICD-10-CM

## 2022-02-11 LAB
SARS-COV-2, RAPID: NOT DETECTED
SPECIMEN DESCRIPTION: NORMAL
TROPONIN INTERP: NORMAL
TROPONIN T: NORMAL NG/ML
TROPONIN, HIGH SENSITIVITY: <6 NG/L (ref 0–14)

## 2022-02-11 PROCEDURE — 71045 X-RAY EXAM CHEST 1 VIEW: CPT

## 2022-02-11 PROCEDURE — 99285 EMERGENCY DEPT VISIT HI MDM: CPT

## 2022-02-11 PROCEDURE — 36415 COLL VENOUS BLD VENIPUNCTURE: CPT

## 2022-02-11 PROCEDURE — 84484 ASSAY OF TROPONIN QUANT: CPT

## 2022-02-11 PROCEDURE — 6370000000 HC RX 637 (ALT 250 FOR IP): Performed by: EMERGENCY MEDICINE

## 2022-02-11 PROCEDURE — 93005 ELECTROCARDIOGRAM TRACING: CPT | Performed by: EMERGENCY MEDICINE

## 2022-02-11 PROCEDURE — 87635 SARS-COV-2 COVID-19 AMP PRB: CPT

## 2022-02-11 RX ORDER — FAMOTIDINE 20 MG/1
20 TABLET, FILM COATED ORAL ONCE
Status: COMPLETED | OUTPATIENT
Start: 2022-02-11 | End: 2022-02-11

## 2022-02-11 RX ORDER — FAMOTIDINE 20 MG/1
20 TABLET, FILM COATED ORAL 2 TIMES DAILY
Qty: 20 TABLET | Refills: 0 | Status: SHIPPED | OUTPATIENT
Start: 2022-02-11 | End: 2022-03-31 | Stop reason: SDUPTHER

## 2022-02-11 RX ADMIN — FAMOTIDINE 20 MG: 20 TABLET, FILM COATED ORAL at 18:08

## 2022-02-11 ASSESSMENT — HEART SCORE: ECG: 0
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
## 2022-02-11 NOTE — ED TRIAGE NOTES
Patient to emergency department with complaints of chest tightness/ throat tightness intermittently for a week. Pt states she has anxiety and that is causing her to have sob.

## 2022-02-12 NOTE — ED NOTES
Pt having panic attack- doesn't like hospitals, afraid she's going to die or have a heart attack. Pt states she has anxiety and since she has covid has been worse. Has been on meds for it but since covid has not helped. Does have appointment with PCP on Monday.       Radha Marte RN  02/11/22 0288

## 2022-02-15 LAB
EKG ATRIAL RATE: 60 BPM
EKG P AXIS: 20 DEGREES
EKG P-R INTERVAL: 128 MS
EKG Q-T INTERVAL: 414 MS
EKG QRS DURATION: 88 MS
EKG QTC CALCULATION (BAZETT): 414 MS
EKG R AXIS: 53 DEGREES
EKG T AXIS: 35 DEGREES
EKG VENTRICULAR RATE: 60 BPM

## 2022-02-15 PROCEDURE — 93010 ELECTROCARDIOGRAM REPORT: CPT | Performed by: INTERNAL MEDICINE

## 2022-09-26 ENCOUNTER — OFFICE VISIT (OUTPATIENT)
Dept: OBGYN CLINIC | Age: 22
End: 2022-09-26
Payer: COMMERCIAL

## 2022-09-26 ENCOUNTER — HOSPITAL ENCOUNTER (OUTPATIENT)
Age: 22
Setting detail: SPECIMEN
Discharge: HOME | End: 2022-09-26

## 2022-09-26 VITALS
HEIGHT: 61 IN | SYSTOLIC BLOOD PRESSURE: 116 MMHG | BODY MASS INDEX: 34.74 KG/M2 | HEART RATE: 84 BPM | DIASTOLIC BLOOD PRESSURE: 76 MMHG | WEIGHT: 184 LBS

## 2022-09-26 DIAGNOSIS — Z01.419 PAP SMEAR, AS PART OF ROUTINE GYNECOLOGICAL EXAMINATION: ICD-10-CM

## 2022-09-26 DIAGNOSIS — Z01.419 PAP SMEAR, AS PART OF ROUTINE GYNECOLOGICAL EXAMINATION: Primary | ICD-10-CM

## 2022-09-26 DIAGNOSIS — N76.0 ACUTE VAGINITIS: ICD-10-CM

## 2022-09-26 DIAGNOSIS — Z11.3 SCREENING FOR STD (SEXUALLY TRANSMITTED DISEASE): ICD-10-CM

## 2022-09-26 DIAGNOSIS — Z20.2 POSSIBLE EXPOSURE TO STD: ICD-10-CM

## 2022-09-26 LAB
CANDIDA SPECIES, DNA PROBE: NEGATIVE
GARDNERELLA VAGINALIS, DNA PROBE: POSITIVE
HEPATITIS C ANTIBODY: NONREACTIVE
HIV AG/AB: NONREACTIVE
SOURCE: ABNORMAL
T. PALLIDUM, IGG: NONREACTIVE
TRICHOMONAS VAGINALIS DNA: NEGATIVE

## 2022-09-26 PROCEDURE — 99395 PREV VISIT EST AGE 18-39: CPT | Performed by: CLINICAL NURSE SPECIALIST

## 2022-09-26 ASSESSMENT — PATIENT HEALTH QUESTIONNAIRE - PHQ9
SUM OF ALL RESPONSES TO PHQ QUESTIONS 1-9: 0
1. LITTLE INTEREST OR PLEASURE IN DOING THINGS: 0
2. FEELING DOWN, DEPRESSED OR HOPELESS: 0
SUM OF ALL RESPONSES TO PHQ QUESTIONS 1-9: 0
SUM OF ALL RESPONSES TO PHQ9 QUESTIONS 1 & 2: 0
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
## 2022-09-27 ENCOUNTER — TELEPHONE (OUTPATIENT)
Dept: OBGYN CLINIC | Age: 22
End: 2022-09-27

## 2022-09-27 DIAGNOSIS — B96.89 BV (BACTERIAL VAGINOSIS): Primary | ICD-10-CM

## 2022-09-27 DIAGNOSIS — N76.0 BV (BACTERIAL VAGINOSIS): Primary | ICD-10-CM

## 2022-09-27 LAB
C TRACH DNA GENITAL QL NAA+PROBE: NEGATIVE
HERPES SIMPLEX VIRUS 1 IGG: 0.6
HERPES SIMPLEX VIRUS 2 IGG: 0.13
HERPES TYPE 1/2 IGM COMBINED: 0.56
N. GONORRHOEAE DNA: NEGATIVE
SPECIMEN DESCRIPTION: NORMAL

## 2022-09-27 RX ORDER — FLUCONAZOLE 100 MG/1
100 TABLET ORAL DAILY
Qty: 7 TABLET | Refills: 0 | Status: SHIPPED | OUTPATIENT
Start: 2022-09-27 | End: 2022-10-04

## 2022-09-27 RX ORDER — METRONIDAZOLE 500 MG/1
500 TABLET ORAL 2 TIMES DAILY
Qty: 14 TABLET | Refills: 0 | Status: SHIPPED | OUTPATIENT
Start: 2022-09-27 | End: 2022-10-04

## 2022-09-28 LAB — HEPATITIS BE ANTIGEN: NEGATIVE

## 2023-07-30 ENCOUNTER — HOSPITAL ENCOUNTER (EMERGENCY)
Age: 23
Discharge: HOME | End: 2023-07-30
Attending: EMERGENCY MEDICINE
Payer: COMMERCIAL

## 2023-07-30 ENCOUNTER — APPOINTMENT (OUTPATIENT)
Dept: CT IMAGING | Age: 23
End: 2023-07-30
Payer: COMMERCIAL

## 2023-07-30 VITALS
RESPIRATION RATE: 17 BRPM | SYSTOLIC BLOOD PRESSURE: 132 MMHG | TEMPERATURE: 98.2 F | OXYGEN SATURATION: 100 % | HEIGHT: 61 IN | HEART RATE: 88 BPM | DIASTOLIC BLOOD PRESSURE: 87 MMHG | BODY MASS INDEX: 33.99 KG/M2 | WEIGHT: 180 LBS

## 2023-07-30 DIAGNOSIS — S00.83XA CONTUSION OF FACE, INITIAL ENCOUNTER: Primary | ICD-10-CM

## 2023-07-30 PROCEDURE — 70486 CT MAXILLOFACIAL W/O DYE: CPT

## 2023-07-30 PROCEDURE — 99284 EMERGENCY DEPT VISIT MOD MDM: CPT

## 2023-07-30 ASSESSMENT — PAIN - FUNCTIONAL ASSESSMENT: PAIN_FUNCTIONAL_ASSESSMENT: 0-10

## 2023-07-30 ASSESSMENT — LIFESTYLE VARIABLES
HOW MANY STANDARD DRINKS CONTAINING ALCOHOL DO YOU HAVE ON A TYPICAL DAY: 5 OR 6
HOW OFTEN DO YOU HAVE A DRINK CONTAINING ALCOHOL: 2-4 TIMES A MONTH

## 2023-07-30 ASSESSMENT — ENCOUNTER SYMPTOMS
CONSTIPATION: 0
DIARRHEA: 0
RHINORRHEA: 0
COUGH: 0
SORE THROAT: 0
ABDOMINAL PAIN: 0
WHEEZING: 0
BACK PAIN: 0
FACIAL SWELLING: 0
NAUSEA: 0
SHORTNESS OF BREATH: 0
CHEST TIGHTNESS: 0
VOMITING: 0
SINUS PRESSURE: 0
EYE REDNESS: 0
EYE PAIN: 0
TROUBLE SWALLOWING: 0
EYE DISCHARGE: 0
COLOR CHANGE: 0
BLOOD IN STOOL: 0

## 2023-07-30 ASSESSMENT — PAIN DESCRIPTION - LOCATION: LOCATION: FACE

## 2023-07-30 ASSESSMENT — VISUAL ACUITY: OU: 1
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
## 2023-08-16 ENCOUNTER — HOSPITAL ENCOUNTER (OUTPATIENT)
Age: 23
Setting detail: SPECIMEN
Discharge: HOME | End: 2023-08-16

## 2023-08-16 ENCOUNTER — OFFICE VISIT (OUTPATIENT)
Dept: OBGYN CLINIC | Age: 23
End: 2023-08-16

## 2023-08-16 VITALS
SYSTOLIC BLOOD PRESSURE: 108 MMHG | BODY MASS INDEX: 34.25 KG/M2 | WEIGHT: 181.4 LBS | HEART RATE: 88 BPM | HEIGHT: 61 IN | DIASTOLIC BLOOD PRESSURE: 70 MMHG

## 2023-08-16 DIAGNOSIS — R39.15 URINARY URGENCY: ICD-10-CM

## 2023-08-16 DIAGNOSIS — R39.9 UTI SYMPTOMS: Primary | ICD-10-CM

## 2023-08-16 LAB
BILIRUBIN, POC: ABNORMAL
BLOOD URINE, POC: ABNORMAL
CLARITY, POC: CLEAR
COLOR, POC: YELLOW
GLUCOSE URINE, POC: NEGATIVE
KETONES, POC: NEGATIVE
LEUKOCYTE EST, POC: ABNORMAL
NITRITE, POC: NEGATIVE
PH, POC: 6
PROTEIN, POC: NEGATIVE
SPECIFIC GRAVITY, POC: 1.01
UROBILINOGEN, POC: NEGATIVE

## 2023-08-16 RX ORDER — NITROFURANTOIN 25; 75 MG/1; MG/1
100 CAPSULE ORAL 2 TIMES DAILY
Qty: 20 CAPSULE | Refills: 0 | Status: SHIPPED | OUTPATIENT
Start: 2023-08-16 | End: 2023-08-26

## 2023-08-16 SDOH — ECONOMIC STABILITY: FOOD INSECURITY: WITHIN THE PAST 12 MONTHS, YOU WORRIED THAT YOUR FOOD WOULD RUN OUT BEFORE YOU GOT MONEY TO BUY MORE.: NEVER TRUE

## 2023-08-16 SDOH — ECONOMIC STABILITY: HOUSING INSECURITY
IN THE LAST 12 MONTHS, WAS THERE A TIME WHEN YOU DID NOT HAVE A STEADY PLACE TO SLEEP OR SLEPT IN A SHELTER (INCLUDING NOW)?: NO

## 2023-08-16 SDOH — ECONOMIC STABILITY: FOOD INSECURITY: WITHIN THE PAST 12 MONTHS, THE FOOD YOU BOUGHT JUST DIDN'T LAST AND YOU DIDN'T HAVE MONEY TO GET MORE.: NEVER TRUE

## 2023-08-16 SDOH — ECONOMIC STABILITY: TRANSPORTATION INSECURITY
IN THE PAST 12 MONTHS, HAS LACK OF TRANSPORTATION KEPT YOU FROM MEETINGS, WORK, OR FROM GETTING THINGS NEEDED FOR DAILY LIVING?: NO

## 2023-08-16 SDOH — ECONOMIC STABILITY: INCOME INSECURITY: HOW HARD IS IT FOR YOU TO PAY FOR THE VERY BASICS LIKE FOOD, HOUSING, MEDICAL CARE, AND HEATING?: NOT HARD AT ALL

## 2023-08-16 SDOH — ECONOMIC STABILITY: INCOME INSECURITY: HOW HARD IS IT FOR YOU TO PAY FOR THE VERY BASICS LIKE FOOD, HOUSING, MEDICAL CARE, AND HEATING?: NOT VERY HARD

## 2023-08-16 ASSESSMENT — PATIENT HEALTH QUESTIONNAIRE - PHQ9
5. POOR APPETITE OR OVEREATING: MORE THAN HALF THE DAYS
6. FEELING BAD ABOUT YOURSELF - OR THAT YOU ARE A FAILURE OR HAVE LET YOURSELF OR YOUR FAMILY DOWN: MORE THAN HALF THE DAYS
7. TROUBLE CONCENTRATING ON THINGS, SUCH AS READING THE NEWSPAPER OR WATCHING TELEVISION: NOT AT ALL
3. TROUBLE FALLING OR STAYING ASLEEP: 2
10. IF YOU CHECKED OFF ANY PROBLEMS, HOW DIFFICULT HAVE THESE PROBLEMS MADE IT FOR YOU TO DO YOUR WORK, TAKE CARE OF THINGS AT HOME, OR GET ALONG WITH OTHER PEOPLE: 1
1. LITTLE INTEREST OR PLEASURE IN DOING THINGS: SEVERAL DAYS
8. MOVING OR SPEAKING SO SLOWLY THAT OTHER PEOPLE COULD HAVE NOTICED. OR THE OPPOSITE, BEING SO FIGETY OR RESTLESS THAT YOU HAVE BEEN MOVING AROUND A LOT MORE THAN USUAL: 1
7. TROUBLE CONCENTRATING ON THINGS, SUCH AS READING THE NEWSPAPER OR WATCHING TELEVISION: 0
10. IF YOU CHECKED OFF ANY PROBLEMS, HOW DIFFICULT HAVE THESE PROBLEMS MADE IT FOR YOU TO DO YOUR WORK, TAKE CARE OF THINGS AT HOME, OR GET ALONG WITH OTHER PEOPLE: SOMEWHAT DIFFICULT
SUM OF ALL RESPONSES TO PHQ QUESTIONS 1-9: 13
4. FEELING TIRED OR HAVING LITTLE ENERGY: NEARLY EVERY DAY
1. LITTLE INTEREST OR PLEASURE IN DOING THINGS: 1
SUM OF ALL RESPONSES TO PHQ QUESTIONS 1-9: 13
2. FEELING DOWN, DEPRESSED OR HOPELESS: 2
3. TROUBLE FALLING OR STAYING ASLEEP: MORE THAN HALF THE DAYS
SUM OF ALL RESPONSES TO PHQ QUESTIONS 1-9: 13
6. FEELING BAD ABOUT YOURSELF - OR THAT YOU ARE A FAILURE OR HAVE LET YOURSELF OR YOUR FAMILY DOWN: 2
2. FEELING DOWN, DEPRESSED OR HOPELESS: MORE THAN HALF THE DAYS
5. POOR APPETITE OR OVEREATING: 2
8. MOVING OR SPEAKING SO SLOWLY THAT OTHER PEOPLE COULD HAVE NOTICED. OR THE OPPOSITE - BEING SO FIDGETY OR RESTLESS THAT YOU HAVE BEEN MOVING AROUND A LOT MORE THAN USUAL: SEVERAL DAYS
SUM OF ALL RESPONSES TO PHQ9 QUESTIONS 1 & 2: 3
9. THOUGHTS THAT YOU WOULD BE BETTER OFF DEAD, OR OF HURTING YOURSELF: NOT AT ALL
9. THOUGHTS THAT YOU WOULD BE BETTER OFF DEAD, OR OF HURTING YOURSELF: 0
SUM OF ALL RESPONSES TO PHQ QUESTIONS 1-9: 13
4. FEELING TIRED OR HAVING LITTLE ENERGY: 3
SUM OF ALL RESPONSES TO PHQ QUESTIONS 1-9: 13

## 2023-08-17 DIAGNOSIS — R39.15 URINARY URGENCY: ICD-10-CM

## 2023-08-17 DIAGNOSIS — R39.9 UTI SYMPTOMS: ICD-10-CM

## 2023-08-18 ENCOUNTER — TELEPHONE (OUTPATIENT)
Dept: OBGYN CLINIC | Age: 23
End: 2023-08-18

## 2023-08-19 LAB
MICROORGANISM SPEC CULT: ABNORMAL
MICROORGANISM SPEC CULT: ABNORMAL
SPECIMEN DESCRIPTION: ABNORMAL

## 2023-08-21 RX ORDER — AMPICILLIN 500 MG/1
500 CAPSULE ORAL 4 TIMES DAILY
Qty: 40 CAPSULE | Refills: 0 | Status: SHIPPED | OUTPATIENT
Start: 2023-08-21 | End: 2023-08-31

## 2023-10-16 ENCOUNTER — HOSPITAL ENCOUNTER (OUTPATIENT)
Age: 23
Setting detail: SPECIMEN
Discharge: HOME | End: 2023-10-16

## 2023-10-16 ENCOUNTER — OFFICE VISIT (OUTPATIENT)
Dept: OBGYN CLINIC | Age: 23
End: 2023-10-16
Payer: COMMERCIAL

## 2023-10-16 VITALS
HEIGHT: 61 IN | BODY MASS INDEX: 33.04 KG/M2 | HEART RATE: 81 BPM | SYSTOLIC BLOOD PRESSURE: 110 MMHG | DIASTOLIC BLOOD PRESSURE: 72 MMHG | WEIGHT: 175 LBS

## 2023-10-16 DIAGNOSIS — Z11.3 SCREEN FOR STD (SEXUALLY TRANSMITTED DISEASE): ICD-10-CM

## 2023-10-16 DIAGNOSIS — Z01.419 WELL WOMAN EXAM WITH ROUTINE GYNECOLOGICAL EXAM: Primary | ICD-10-CM

## 2023-10-16 PROCEDURE — 99395 PREV VISIT EST AGE 18-39: CPT | Performed by: CLINICAL NURSE SPECIALIST

## 2023-10-16 ASSESSMENT — PATIENT HEALTH QUESTIONNAIRE - PHQ9
SUM OF ALL RESPONSES TO PHQ QUESTIONS 1-9: 0
SUM OF ALL RESPONSES TO PHQ9 QUESTIONS 1 & 2: 0
1. LITTLE INTEREST OR PLEASURE IN DOING THINGS: 0
2. FEELING DOWN, DEPRESSED OR HOPELESS: 0
SUM OF ALL RESPONSES TO PHQ QUESTIONS 1-9: 0
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
## 2023-10-17 DIAGNOSIS — B96.89 BV (BACTERIAL VAGINOSIS): Primary | ICD-10-CM

## 2023-10-17 DIAGNOSIS — N76.0 BV (BACTERIAL VAGINOSIS): Primary | ICD-10-CM

## 2023-10-17 DIAGNOSIS — Z11.3 SCREEN FOR STD (SEXUALLY TRANSMITTED DISEASE): ICD-10-CM

## 2023-10-17 DIAGNOSIS — Z01.419 WELL WOMAN EXAM WITH ROUTINE GYNECOLOGICAL EXAM: ICD-10-CM

## 2023-10-17 LAB
CANDIDA SPECIES: NEGATIVE
GARDNERELLA VAGINALIS: POSITIVE
HCV AB SERPL QL IA: NONREACTIVE
HIV 1+2 AB+HIV1 P24 AG SERPL QL IA: NONREACTIVE
SOURCE: ABNORMAL
T PALLIDUM AB SER QL IA: NONREACTIVE
TRICHOMONAS: NEGATIVE

## 2023-10-17 RX ORDER — METRONIDAZOLE 500 MG/1
500 TABLET ORAL 2 TIMES DAILY
Qty: 14 TABLET | Refills: 0 | Status: SHIPPED | OUTPATIENT
Start: 2023-10-17 | End: 2023-10-24

## 2023-10-18 DIAGNOSIS — A74.9 CHLAMYDIA: Primary | ICD-10-CM

## 2023-10-18 LAB
C TRACH DNA SPEC QL PROBE+SIG AMP: ABNORMAL
N GONORRHOEA DNA SPEC QL PROBE+SIG AMP: NEGATIVE
SPECIMEN DESCRIPTION: ABNORMAL

## 2023-10-18 RX ORDER — DOXYCYCLINE HYCLATE 100 MG
100 TABLET ORAL 2 TIMES DAILY
Qty: 14 TABLET | Refills: 0 | Status: SHIPPED | OUTPATIENT
Start: 2023-10-18 | End: 2023-10-25

## 2023-10-19 LAB
HSV1 IGG SERPL QL IA: 0.73
HSV1+2 IGM SER QL IA: 1.03
HSV2 AB SER QL IA: 0.18

## 2023-12-11 ENCOUNTER — OFFICE VISIT (OUTPATIENT)
Dept: OBGYN CLINIC | Age: 23
End: 2023-12-11
Payer: COMMERCIAL

## 2023-12-11 VITALS
SYSTOLIC BLOOD PRESSURE: 108 MMHG | WEIGHT: 182 LBS | HEIGHT: 61 IN | DIASTOLIC BLOOD PRESSURE: 78 MMHG | BODY MASS INDEX: 34.36 KG/M2 | HEART RATE: 59 BPM

## 2023-12-11 DIAGNOSIS — N63.10 MASS OF RIGHT BREAST, UNSPECIFIED QUADRANT: ICD-10-CM

## 2023-12-11 DIAGNOSIS — N64.4 PAIN OF RIGHT BREAST: Primary | ICD-10-CM

## 2023-12-11 PROCEDURE — 99213 OFFICE O/P EST LOW 20 MIN: CPT | Performed by: CLINICAL NURSE SPECIALIST

## 2023-12-11 ASSESSMENT — ENCOUNTER SYMPTOMS
ALLERGIC/IMMUNOLOGIC NEGATIVE: 1
GASTROINTESTINAL NEGATIVE: 1
RESPIRATORY NEGATIVE: 1
EYES NEGATIVE: 1

## 2023-12-13 ENCOUNTER — HOSPITAL ENCOUNTER (OUTPATIENT)
Dept: WOMENS IMAGING | Age: 23
Discharge: HOME | End: 2023-12-15
Payer: COMMERCIAL

## 2023-12-13 DIAGNOSIS — N64.4 PAIN OF RIGHT BREAST: ICD-10-CM

## 2023-12-13 DIAGNOSIS — N63.10 MASS OF RIGHT BREAST, UNSPECIFIED QUADRANT: ICD-10-CM

## 2023-12-13 PROCEDURE — 76642 ULTRASOUND BREAST LIMITED: CPT

## 2024-01-08 ENCOUNTER — HOSPITAL ENCOUNTER (OUTPATIENT)
Age: 24
Setting detail: SPECIMEN
Discharge: HOME | End: 2024-01-08

## 2024-01-08 ENCOUNTER — OFFICE VISIT (OUTPATIENT)
Dept: OBGYN CLINIC | Age: 24
End: 2024-01-08
Payer: COMMERCIAL

## 2024-01-08 VITALS
WEIGHT: 182 LBS | HEART RATE: 95 BPM | DIASTOLIC BLOOD PRESSURE: 82 MMHG | HEIGHT: 61 IN | SYSTOLIC BLOOD PRESSURE: 128 MMHG | BODY MASS INDEX: 34.36 KG/M2

## 2024-01-08 DIAGNOSIS — N92.6 MISSED MENSES: ICD-10-CM

## 2024-01-08 DIAGNOSIS — Z32.02 NEGATIVE PREGNANCY TEST: ICD-10-CM

## 2024-01-08 DIAGNOSIS — N92.6 MISSED MENSES: Primary | ICD-10-CM

## 2024-01-08 PROCEDURE — 81025 URINE PREGNANCY TEST: CPT | Performed by: CLINICAL NURSE SPECIALIST

## 2024-01-08 PROCEDURE — 99213 OFFICE O/P EST LOW 20 MIN: CPT | Performed by: CLINICAL NURSE SPECIALIST

## 2024-01-08 RX ORDER — PNV NO.95/FERROUS FUM/FOLIC AC 28MG-0.8MG
1 TABLET ORAL DAILY
Qty: 30 TABLET | Refills: 11 | Status: SHIPPED | OUTPATIENT
Start: 2024-01-08

## 2024-01-08 ASSESSMENT — ENCOUNTER SYMPTOMS
GASTROINTESTINAL NEGATIVE: 1
EYES NEGATIVE: 1
ALLERGIC/IMMUNOLOGIC NEGATIVE: 1
RESPIRATORY NEGATIVE: 1
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
## 2024-01-09 LAB — B-HCG SERPL EIA 3RD IS-ACNC: <1 MIU/ML

## 2024-03-27 ENCOUNTER — OFFICE VISIT (OUTPATIENT)
Dept: OBGYN CLINIC | Age: 24
End: 2024-03-27
Payer: COMMERCIAL

## 2024-03-27 ENCOUNTER — HOSPITAL ENCOUNTER (OUTPATIENT)
Age: 24
Setting detail: SPECIMEN
Discharge: HOME | End: 2024-03-27

## 2024-03-27 VITALS
WEIGHT: 183 LBS | SYSTOLIC BLOOD PRESSURE: 116 MMHG | HEART RATE: 91 BPM | BODY MASS INDEX: 34.55 KG/M2 | HEIGHT: 61 IN | DIASTOLIC BLOOD PRESSURE: 84 MMHG

## 2024-03-27 DIAGNOSIS — N76.0 ACUTE VAGINITIS: ICD-10-CM

## 2024-03-27 DIAGNOSIS — Z11.3 SCREENING EXAMINATION FOR STD (SEXUALLY TRANSMITTED DISEASE): Primary | ICD-10-CM

## 2024-03-27 DIAGNOSIS — Z20.2 CHLAMYDIA CONTACT, TREATED: ICD-10-CM

## 2024-03-27 DIAGNOSIS — Z13.31 POSITIVE DEPRESSION SCREENING: ICD-10-CM

## 2024-03-27 DIAGNOSIS — Z11.3 SCREENING EXAMINATION FOR STD (SEXUALLY TRANSMITTED DISEASE): ICD-10-CM

## 2024-03-27 PROCEDURE — 99213 OFFICE O/P EST LOW 20 MIN: CPT | Performed by: CLINICAL NURSE SPECIALIST

## 2024-03-27 ASSESSMENT — PATIENT HEALTH QUESTIONNAIRE - PHQ9
SUM OF ALL RESPONSES TO PHQ QUESTIONS 1-9: 10
5. POOR APPETITE OR OVEREATING: MORE THAN HALF THE DAYS
4. FEELING TIRED OR HAVING LITTLE ENERGY: MORE THAN HALF THE DAYS
10. IF YOU CHECKED OFF ANY PROBLEMS, HOW DIFFICULT HAVE THESE PROBLEMS MADE IT FOR YOU TO DO YOUR WORK, TAKE CARE OF THINGS AT HOME, OR GET ALONG WITH OTHER PEOPLE: NOT DIFFICULT AT ALL
3. TROUBLE FALLING OR STAYING ASLEEP: MORE THAN HALF THE DAYS
9. THOUGHTS THAT YOU WOULD BE BETTER OFF DEAD, OR OF HURTING YOURSELF: NOT AT ALL
1. LITTLE INTEREST OR PLEASURE IN DOING THINGS: SEVERAL DAYS
SUM OF ALL RESPONSES TO PHQ QUESTIONS 1-9: 10
7. TROUBLE CONCENTRATING ON THINGS, SUCH AS READING THE NEWSPAPER OR WATCHING TELEVISION: SEVERAL DAYS
SUM OF ALL RESPONSES TO PHQ QUESTIONS 1-9: 10
8. MOVING OR SPEAKING SO SLOWLY THAT OTHER PEOPLE COULD HAVE NOTICED. OR THE OPPOSITE, BEING SO FIGETY OR RESTLESS THAT YOU HAVE BEEN MOVING AROUND A LOT MORE THAN USUAL: NOT AT ALL
SUM OF ALL RESPONSES TO PHQ9 QUESTIONS 1 & 2: 2
SUM OF ALL RESPONSES TO PHQ QUESTIONS 1-9: 10
6. FEELING BAD ABOUT YOURSELF - OR THAT YOU ARE A FAILURE OR HAVE LET YOURSELF OR YOUR FAMILY DOWN: SEVERAL DAYS
2. FEELING DOWN, DEPRESSED OR HOPELESS: SEVERAL DAYS
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
## 2024-03-28 DIAGNOSIS — B96.89 BV (BACTERIAL VAGINOSIS): Primary | ICD-10-CM

## 2024-03-28 DIAGNOSIS — N76.0 BV (BACTERIAL VAGINOSIS): Primary | ICD-10-CM

## 2024-03-28 LAB
C TRACH DNA SPEC QL PROBE+SIG AMP: NEGATIVE
CANDIDA SPECIES: NEGATIVE
GARDNERELLA VAGINALIS: POSITIVE
N GONORRHOEA DNA SPEC QL PROBE+SIG AMP: NEGATIVE
SOURCE: ABNORMAL
SPECIMEN DESCRIPTION: NORMAL
TRICHOMONAS: NEGATIVE

## 2024-03-28 RX ORDER — METRONIDAZOLE 500 MG/1
500 TABLET ORAL 2 TIMES DAILY
Qty: 14 TABLET | Refills: 0 | Status: SHIPPED | OUTPATIENT
Start: 2024-03-28 | End: 2024-04-04

## 2024-03-28 RX ORDER — FLUCONAZOLE 100 MG/1
100 TABLET ORAL DAILY
Qty: 7 TABLET | Refills: 0 | Status: SHIPPED | OUTPATIENT
Start: 2024-03-28 | End: 2024-04-04

## 2024-06-23 ENCOUNTER — HOSPITAL ENCOUNTER (EMERGENCY)
Age: 24
Discharge: HOME | End: 2024-06-23
Attending: EMERGENCY MEDICINE
Payer: COMMERCIAL

## 2024-06-23 VITALS
TEMPERATURE: 98.1 F | HEIGHT: 61 IN | OXYGEN SATURATION: 97 % | HEART RATE: 77 BPM | WEIGHT: 175 LBS | RESPIRATION RATE: 14 BRPM | SYSTOLIC BLOOD PRESSURE: 119 MMHG | BODY MASS INDEX: 33.04 KG/M2 | DIASTOLIC BLOOD PRESSURE: 81 MMHG

## 2024-06-23 DIAGNOSIS — H66.92 LEFT OTITIS MEDIA, UNSPECIFIED OTITIS MEDIA TYPE: Primary | ICD-10-CM

## 2024-06-23 PROCEDURE — 99283 EMERGENCY DEPT VISIT LOW MDM: CPT

## 2024-06-23 PROCEDURE — 6370000000 HC RX 637 (ALT 250 FOR IP): Performed by: EMERGENCY MEDICINE

## 2024-06-23 RX ORDER — AMOXICILLIN AND CLAVULANATE POTASSIUM 875; 125 MG/1; MG/1
1 TABLET, FILM COATED ORAL ONCE
Status: COMPLETED | OUTPATIENT
Start: 2024-06-23 | End: 2024-06-23

## 2024-06-23 RX ORDER — AMOXICILLIN AND CLAVULANATE POTASSIUM 875; 125 MG/1; MG/1
1 TABLET, FILM COATED ORAL 2 TIMES DAILY
Qty: 14 TABLET | Refills: 0 | Status: SHIPPED | OUTPATIENT
Start: 2024-06-23 | End: 2024-06-30

## 2024-06-23 RX ADMIN — AMOXICILLIN AND CLAVULANATE POTASSIUM 1 TABLET: 875; 125 TABLET, FILM COATED ORAL at 21:50

## 2024-06-23 ASSESSMENT — LIFESTYLE VARIABLES
HOW OFTEN DO YOU HAVE A DRINK CONTAINING ALCOHOL: NEVER
HOW MANY STANDARD DRINKS CONTAINING ALCOHOL DO YOU HAVE ON A TYPICAL DAY: PATIENT DOES NOT DRINK

## 2024-06-23 ASSESSMENT — PAIN DESCRIPTION - DESCRIPTORS: DESCRIPTORS: ACHING

## 2024-06-23 ASSESSMENT — PAIN DESCRIPTION - PAIN TYPE: TYPE: ACUTE PAIN

## 2024-06-23 ASSESSMENT — PAIN SCALES - GENERAL: PAINLEVEL_OUTOF10: 5

## 2024-06-23 ASSESSMENT — PAIN - FUNCTIONAL ASSESSMENT: PAIN_FUNCTIONAL_ASSESSMENT: 0-10

## 2024-06-23 ASSESSMENT — PAIN DESCRIPTION - ORIENTATION: ORIENTATION: LEFT

## 2024-06-23 ASSESSMENT — PAIN DESCRIPTION - LOCATION: LOCATION: EAR

## 2024-06-24 NOTE — ED PROVIDER NOTES
External ear normal. Tympanic membrane is not scarred, perforated, erythematous, retracted or bulging.      Left Ear: External ear normal. Tympanic membrane is erythematous and bulging. Tympanic membrane is not scarred, perforated or retracted.      Mouth/Throat:      Mouth: Mucous membranes are moist.   Eyes:      Extraocular Movements: Extraocular movements intact.   Cardiovascular:      Rate and Rhythm: Normal rate and regular rhythm.      Pulses:           Radial pulses are 2+ on the right side and 2+ on the left side.        Dorsalis pedis pulses are 2+ on the right side and 2+ on the left side.        Posterior tibial pulses are 2+ on the right side and 2+ on the left side.      Heart sounds: Normal heart sounds.   Pulmonary:      Effort: Pulmonary effort is normal. No respiratory distress.   Abdominal:      General: There is no distension.      Palpations: Abdomen is soft.      Tenderness: There is no abdominal tenderness.   Musculoskeletal:         General: Normal range of motion.   Skin:     General: Skin is warm.   Neurological:      General: No focal deficit present.      Mental Status: She is alert and oriented to person, place, and time.   Psychiatric:         Behavior: Behavior normal.         MEDICAL DECISION MAKING / ED COURSE:         1)  Number and Complexity of Problems Addressed at this Encounter  Problem List This Visit: Left ear pain    Differential Diagnosis: Otitis media, otitis externa    Presenting with chief complaint of left ear pain.  Pain started today.  She says it feels like it is full and there is some ringing in it.  She describes a sharp pain in the left ear.    2)  Data Reviewed and Analyzed  (Lab and radiology tests/orders below in next section)      3)  Treatment and Disposition           Patient has left-sided otitis media.  She was given a prescription for Augmentin and follow-up instructions with her PCP. She is agreeable with this plan. She is stable for discharge at this

## 2024-09-17 ENCOUNTER — OFFICE VISIT (OUTPATIENT)
Dept: OBGYN CLINIC | Age: 24
End: 2024-09-17
Payer: COMMERCIAL

## 2024-09-17 ENCOUNTER — HOSPITAL ENCOUNTER (OUTPATIENT)
Age: 24
Setting detail: SPECIMEN
Discharge: HOME | End: 2024-09-17

## 2024-09-17 VITALS
WEIGHT: 176 LBS | SYSTOLIC BLOOD PRESSURE: 122 MMHG | BODY MASS INDEX: 33.23 KG/M2 | DIASTOLIC BLOOD PRESSURE: 86 MMHG | HEART RATE: 91 BPM | HEIGHT: 61 IN

## 2024-09-17 DIAGNOSIS — N76.0 ACUTE VAGINITIS: ICD-10-CM

## 2024-09-17 DIAGNOSIS — Z11.3 SCREEN FOR STD (SEXUALLY TRANSMITTED DISEASE): ICD-10-CM

## 2024-09-17 DIAGNOSIS — Z11.3 SCREEN FOR STD (SEXUALLY TRANSMITTED DISEASE): Primary | ICD-10-CM

## 2024-09-17 PROCEDURE — 99213 OFFICE O/P EST LOW 20 MIN: CPT | Performed by: CLINICAL NURSE SPECIALIST

## 2024-09-17 RX ORDER — METRONIDAZOLE 500 MG/1
500 TABLET ORAL 2 TIMES DAILY
Qty: 14 TABLET | Refills: 0 | Status: SHIPPED | OUTPATIENT
Start: 2024-09-17 | End: 2024-09-24

## 2024-09-17 ASSESSMENT — ENCOUNTER SYMPTOMS
RESPIRATORY NEGATIVE: 1
GASTROINTESTINAL NEGATIVE: 1
ALLERGIC/IMMUNOLOGIC NEGATIVE: 1
EYES NEGATIVE: 1

## 2024-10-30 ENCOUNTER — OFFICE VISIT (OUTPATIENT)
Dept: OBGYN CLINIC | Age: 24
End: 2024-10-30
Payer: COMMERCIAL

## 2024-10-30 ENCOUNTER — HOSPITAL ENCOUNTER (OUTPATIENT)
Age: 24
Setting detail: SPECIMEN
Discharge: HOME | End: 2024-10-30

## 2024-10-30 VITALS
WEIGHT: 178 LBS | HEART RATE: 94 BPM | DIASTOLIC BLOOD PRESSURE: 78 MMHG | SYSTOLIC BLOOD PRESSURE: 114 MMHG | BODY MASS INDEX: 33.61 KG/M2 | HEIGHT: 61 IN

## 2024-10-30 DIAGNOSIS — Z11.51 SCREENING FOR HPV (HUMAN PAPILLOMAVIRUS): ICD-10-CM

## 2024-10-30 DIAGNOSIS — Z01.419 WELL WOMAN EXAM: Primary | ICD-10-CM

## 2024-10-30 PROCEDURE — 99395 PREV VISIT EST AGE 18-39: CPT | Performed by: CLINICAL NURSE SPECIALIST

## 2024-10-30 SDOH — ECONOMIC STABILITY: FOOD INSECURITY: WITHIN THE PAST 12 MONTHS, THE FOOD YOU BOUGHT JUST DIDN'T LAST AND YOU DIDN'T HAVE MONEY TO GET MORE.: NEVER TRUE

## 2024-10-30 SDOH — ECONOMIC STABILITY: FOOD INSECURITY: WITHIN THE PAST 12 MONTHS, YOU WORRIED THAT YOUR FOOD WOULD RUN OUT BEFORE YOU GOT MONEY TO BUY MORE.: NEVER TRUE

## 2024-10-30 SDOH — ECONOMIC STABILITY: INCOME INSECURITY: HOW HARD IS IT FOR YOU TO PAY FOR THE VERY BASICS LIKE FOOD, HOUSING, MEDICAL CARE, AND HEATING?: NOT HARD AT ALL

## 2024-10-30 NOTE — PROGRESS NOTES
Heavy on day 1/irregular periods           yes                                      Vaginal discharge                   no  Hematological: Bruises easy   no     Endocrine:  Hot flashes   no     Hot/Cold Intolerance  no    Psychological:            Mood and affect were within normal limits.        yes                 Physical Exam    Physical Exam:    Vitals:    10/30/24 1454   BP: 114/78   Pulse: 94   Weight: 80.7 kg (178 lb)   Height: 1.549 m (5' 1\")       General Appearance:  This  is a well developed, well nourished, well groomed female.      Her BMI was reviewed. Nutritional decision making andexercise were discussed.    Neurological:  The patient is alert and oriented to time,place, person, and situation    Skin:  A brief inspection of the skin revealed no rashes or lesions.     Neck:  The neck was supple.     Respiratory:  There was unlabored respiratory effort. Lungs clear to ascultation.    Cardiovascular:  The patients extremities were without calf tenderness or edema.Heart with a regular rate and rhythm.    Abdomen:  The abdomen was soft and non-tender with no guarding, rebound or rigidity.  No hernias were appreciated.     Breast:   The patients breasts were symmetrical.  There were no masses, discharge or retractions noted.  Self breast exams were reviewed.    Pelvic Exam:  The external genitalia was with a normal appearance.           Urinary System: Urethral meatus normal    The vaginal vault was normal. There were no cystocele, rectocele, or enterocele appreciated. There was no vaginal discharge.    The cervix was without lesions. There was no cervical motion tenderness.    The uterus was mobile, midline and regular.    The adnexa no fullness, tenderness or masses appreciated.    Rectal exam: deferred per patient request          ASSESSMENT: Normal annual well woman exam    24 y.o.  Female; Annual   Diagnosis Orders   1. Well woman exam  PAP SMEAR      2. Screening for HPV (human papillomavirus)

## 2024-11-06 LAB — CYTOLOGY REPORT: NORMAL

## 2024-11-08 ENCOUNTER — TELEPHONE (OUTPATIENT)
Dept: OBGYN CLINIC | Age: 24
End: 2024-11-08

## 2024-11-08 LAB
HPV I/H RISK 4 DNA CVX QL NAA+PROBE: NOT DETECTED
HPV SAMPLE: NORMAL
HPV, INTERPRETATION: NORMAL
HPV16 DNA CVX QL NAA+PROBE: NOT DETECTED
HPV18 DNA CVX QL NAA+PROBE: NOT DETECTED
SPECIMEN DESCRIPTION: NORMAL

## 2024-11-08 NOTE — TELEPHONE ENCOUNTER
[facility] Result Note Patient Contact Communication   [address] [place] [address]      11/08/24   11:09 AM     Patients Name: Missy Aguiar   Medical Record Number: [id number]   Contact Serial Number: [id number]  YOB: 2000       Patients Phone Number: [phone]     Description of Recommendations:  The patient was contacted and her identity was confirmed with two of the specific identifiers listed above.  The information regarding her recent testing results and provider recommendations were reviewed with her in detail and all questions were answered.   Recent labs/Cultures/ Imaging Studies/Pathology reports and Urinalysis results are included:      Recommendations given by provider:  Please let the patient know that she has ASCUS and neg HPV and per the asccp guidelines she is repeat in 3 yrs     The patient verbalized understanding of the information above and the recommendation by the provider. She will contact her PCP if any other questions arise or contact our office for any other clarifications.        Electronically signed by Sean Perdomo LPN on 11/8/2024 at 11:09 AM

## 2024-11-08 NOTE — TELEPHONE ENCOUNTER
----- Message from JARED Martin CNP sent at 11/8/2024 10:26 AM EST -----  Please let the patient know that she has ASCUS and neg HPV and per the asccp guidelines she is repeat in 3 yrs

## 2025-05-15 ENCOUNTER — TELEPHONE (OUTPATIENT)
Dept: OBGYN CLINIC | Age: 25
End: 2025-05-15

## 2025-05-15 NOTE — TELEPHONE ENCOUNTER
Patient requested appt though Workday. Writer reached out to patient and left message on voicemail to return phone call to schedule appt. Maria Eugenia is out of the office until Monday but patient can be scheduled with Dr. Claros tomorrow at 11:30 am if that appointment is still available.

## 2025-05-22 ENCOUNTER — HOSPITAL ENCOUNTER (OUTPATIENT)
Age: 25
Setting detail: SPECIMEN
Discharge: HOME | End: 2025-05-22

## 2025-05-22 ENCOUNTER — OFFICE VISIT (OUTPATIENT)
Dept: OBGYN CLINIC | Age: 25
End: 2025-05-22

## 2025-05-22 VITALS
HEIGHT: 61 IN | HEART RATE: 70 BPM | WEIGHT: 178 LBS | DIASTOLIC BLOOD PRESSURE: 84 MMHG | SYSTOLIC BLOOD PRESSURE: 122 MMHG | BODY MASS INDEX: 33.61 KG/M2

## 2025-05-22 DIAGNOSIS — N76.0 ACUTE VAGINITIS: ICD-10-CM

## 2025-05-22 DIAGNOSIS — N94.89 VAGINAL BURNING: ICD-10-CM

## 2025-05-22 DIAGNOSIS — N89.8 VAGINAL DISCHARGE: ICD-10-CM

## 2025-05-22 DIAGNOSIS — N89.8 VAGINA ITCHING: ICD-10-CM

## 2025-05-22 DIAGNOSIS — N89.8 VAGINAL DISCHARGE: Primary | ICD-10-CM

## 2025-05-22 RX ORDER — METRONIDAZOLE 7.5 MG/G
GEL VAGINAL
Qty: 1 EACH | Refills: 1 | Status: SHIPPED | OUTPATIENT
Start: 2025-05-22

## 2025-05-23 ENCOUNTER — RESULTS FOLLOW-UP (OUTPATIENT)
Dept: OBGYN CLINIC | Age: 25
End: 2025-05-23

## 2025-05-23 LAB
C TRACH DNA SPEC QL PROBE+SIG AMP: NEGATIVE
CANDIDA SPECIES: NEGATIVE
GARDNERELLA VAGINALIS: POSITIVE
N GONORRHOEA DNA SPEC QL PROBE+SIG AMP: NEGATIVE
SOURCE: ABNORMAL
SPECIMEN DESCRIPTION: NORMAL
TRICHOMONAS: NEGATIVE

## (undated) DEVICE — SOLUTION IV IRRIG WATER 1000ML POUR BRL 2F7114

## (undated) DEVICE — COVER,TABLE,60X90,STERILE: Brand: MEDLINE

## (undated) DEVICE — COVER,MAYO STAND,XL,STERILE: Brand: MEDLINE

## (undated) DEVICE — BLADELESS OBTURATOR: Brand: WECK VISTA

## (undated) DEVICE — Z INACTIVE USE 2641839 CLIP INT M L POLYMER LOK LIG HEM O LOK

## (undated) DEVICE — Z DISCONTINUED GLOVE SURG SZ 7.5 L12IN FNGR THK13MIL WHT ISOLEX

## (undated) DEVICE — BITEBLOCK 54FR W/ DENT RIM BLOX

## (undated) DEVICE — GLOVE ORANGE PI 8   MSG9080

## (undated) DEVICE — ST CHARLES GEN LAPAROSCOPY PK: Brand: MEDLINE INDUSTRIES, INC.

## (undated) DEVICE — SUTURE PDS II SZ 0 L27IN ABSRB VLT UR-6 L26MM 1/2 CIR D7185

## (undated) DEVICE — SPHINCTEROTOME: Brand: DREAMTOME™ RX 44

## (undated) DEVICE — STRAP,POSITIONING,KNEE/BODY,FOAM,4X60": Brand: MEDLINE

## (undated) DEVICE — 3 ML SYRINGE LUER-LOCK TIP: Brand: MONOJECT

## (undated) DEVICE — BLANKET WRM W29.9XL79.1IN UP BODY FORC AIR MISTRAL-AIR

## (undated) DEVICE — JELLY,LUBE,STERILE,FLIP TOP,TUBE,2-OZ: Brand: MEDLINE

## (undated) DEVICE — ARM DRAPE

## (undated) DEVICE — CANNULA SEAL

## (undated) DEVICE — SYRINGE, LUER SLIP, 20ML: Brand: MEDLINE

## (undated) DEVICE — SUTURE MCRYL + SZ 4-0 L27IN ABSRB UD L19MM PS-2 3/8 CIR MCP426H

## (undated) DEVICE — SYSTEM BX CAP BILI RAP EXCHG CAP LOK DEV COMPATIBLE W/ OLY

## (undated) DEVICE — TROCARS: Brand: KII® BALLOON BLUNT TIP SYSTEM

## (undated) DEVICE — GOWN,POLY REINFORCED,LG: Brand: MEDLINE

## (undated) DEVICE — Z INACTIVE USE 2653177 SPONGE GZ W2XL2IN NONWOVEN 4 PLY FASTER WICKING ABIL AVANT

## (undated) DEVICE — GOWN,AURORA,NONREINFORCED,LARGE: Brand: MEDLINE

## (undated) DEVICE — STRAP POS MP 30X3 IN HK LOOP CLOSURE FOAM DISP

## (undated) DEVICE — SYRINGE, LUER LOCK, 10ML: Brand: MEDLINE

## (undated) DEVICE — TROCAR: Brand: KII FIOS FIRST ENTRY

## (undated) DEVICE — RETRIEVAL BALLOON CATHETER: Brand: EXTRACTOR™ PRO RX